# Patient Record
Sex: MALE | Race: OTHER | HISPANIC OR LATINO | URBAN - METROPOLITAN AREA
[De-identification: names, ages, dates, MRNs, and addresses within clinical notes are randomized per-mention and may not be internally consistent; named-entity substitution may affect disease eponyms.]

---

## 2021-04-11 ENCOUNTER — INPATIENT (INPATIENT)
Facility: HOSPITAL | Age: 66
LOS: 11 days | Discharge: HOME | End: 2021-04-23
Attending: PSYCHIATRY & NEUROLOGY | Admitting: PSYCHIATRY & NEUROLOGY
Payer: COMMERCIAL

## 2021-04-11 VITALS
DIASTOLIC BLOOD PRESSURE: 73 MMHG | RESPIRATION RATE: 18 BRPM | HEART RATE: 69 BPM | TEMPERATURE: 98 F | WEIGHT: 250 LBS | HEIGHT: 71 IN | SYSTOLIC BLOOD PRESSURE: 176 MMHG | OXYGEN SATURATION: 97 %

## 2021-04-11 LAB
BASOPHILS # BLD AUTO: 0.07 K/UL — SIGNIFICANT CHANGE UP (ref 0–0.2)
BASOPHILS NFR BLD AUTO: 1.2 % — HIGH (ref 0–1)
EOSINOPHIL # BLD AUTO: 0.46 K/UL — SIGNIFICANT CHANGE UP (ref 0–0.7)
EOSINOPHIL NFR BLD AUTO: 7.8 % — SIGNIFICANT CHANGE UP (ref 0–8)
ETHANOL SERPL-MCNC: <10 MG/DL — SIGNIFICANT CHANGE UP
HCT VFR BLD CALC: 41.2 % — LOW (ref 42–52)
HGB BLD-MCNC: 13.5 G/DL — LOW (ref 14–18)
IMM GRANULOCYTES NFR BLD AUTO: 0.2 % — SIGNIFICANT CHANGE UP (ref 0.1–0.3)
LYMPHOCYTES # BLD AUTO: 2.27 K/UL — SIGNIFICANT CHANGE UP (ref 1.2–3.4)
LYMPHOCYTES # BLD AUTO: 38.3 % — SIGNIFICANT CHANGE UP (ref 20.5–51.1)
MCHC RBC-ENTMCNC: 28.7 PG — SIGNIFICANT CHANGE UP (ref 27–31)
MCHC RBC-ENTMCNC: 32.8 G/DL — SIGNIFICANT CHANGE UP (ref 32–37)
MCV RBC AUTO: 87.7 FL — SIGNIFICANT CHANGE UP (ref 80–94)
MONOCYTES # BLD AUTO: 0.46 K/UL — SIGNIFICANT CHANGE UP (ref 0.1–0.6)
MONOCYTES NFR BLD AUTO: 7.8 % — SIGNIFICANT CHANGE UP (ref 1.7–9.3)
NEUTROPHILS # BLD AUTO: 2.66 K/UL — SIGNIFICANT CHANGE UP (ref 1.4–6.5)
NEUTROPHILS NFR BLD AUTO: 44.7 % — SIGNIFICANT CHANGE UP (ref 42.2–75.2)
NRBC # BLD: 0 /100 WBCS — SIGNIFICANT CHANGE UP (ref 0–0)
PLATELET # BLD AUTO: 158 K/UL — SIGNIFICANT CHANGE UP (ref 130–400)
RBC # BLD: 4.7 M/UL — SIGNIFICANT CHANGE UP (ref 4.7–6.1)
RBC # FLD: 13.9 % — SIGNIFICANT CHANGE UP (ref 11.5–14.5)
WBC # BLD: 5.93 K/UL — SIGNIFICANT CHANGE UP (ref 4.8–10.8)
WBC # FLD AUTO: 5.93 K/UL — SIGNIFICANT CHANGE UP (ref 4.8–10.8)

## 2021-04-11 PROCEDURE — 99285 EMERGENCY DEPT VISIT HI MDM: CPT

## 2021-04-11 PROCEDURE — 90792 PSYCH DIAG EVAL W/MED SRVCS: CPT | Mod: 95

## 2021-04-11 PROCEDURE — 93010 ELECTROCARDIOGRAM REPORT: CPT

## 2021-04-11 NOTE — ED ADULT NURSE NOTE - OBJECTIVE STATEMENT
Pt c/p psych eval , constant observation initiated , reports visual hallucination , pt denies suicidal and homicidal thoughts this time . Per pts son , pt attempted to kill himself earlier this week

## 2021-04-11 NOTE — ED PROVIDER NOTE - PHYSICAL EXAMINATION
CONSTITUTIONAL: Well-developed; well-nourished; in no acute distress, nontoxic appearing  SKIN: skin exam is warm and dry  HEAD: Normocephalic; atraumatic.  ENT: MMM  NECK: ROM intact.  CARD: S1, S2 normal, no murmur  RESP: No wheezes, rales or rhonchi. Good air movement bilaterally  ABD: soft; non-distended; non-tender.   EXT: Normal ROM.   NEURO: awake, alert, following commands, oriented, grossly unremarkable. No Focal deficits. GCS 15.   PSYCH: Cooperative, appropriate.

## 2021-04-11 NOTE — ED ADULT TRIAGE NOTE - CHIEF COMPLAINT QUOTE
pt states he is having visual hallucinations. pt denies suicidal or homicidal thoughts now but as per son pt tried to kill himself earlier this week. 1:1 initiated in triage.

## 2021-04-11 NOTE — ED PROVIDER NOTE - PROGRESS NOTE DETAILS
Discussed with Dr. Patel from [facility], will evaluate patient. I called [facility] and informed them about the COVID-19 test results and that patient is medically cleared by psychiatric evaluation and care. Discussed with [facility], as per them, patient is waiting for Bed availability. Patient remained stable in ED, signed out to Dr. Carey pending [facility] bed availability.

## 2021-04-11 NOTE — ED PROVIDER NOTE - NS ED ROS FT
Review of Systems:  	•	CONSTITUTIONAL: no fever, no diaphoresis, no chills  	•	SKIN: no rash  	•	HEMATOLOGIC: no bleeding, no bruising  	•	EYES: no eye pain, no blurry vision  	•	ENT: no change in hearing, no sore throat, no ear pain or tinnitus  	•	RESPIRATORY: no shortness of breath, no cough  	•	CARDIAC: no chest pain, no palpitations  	•	GI: no abd pain, no nausea, no vomiting, no diarrhea  	•	GENITO-URINARY: no discharge, no dysuria; no hematuria, no increased urinary frequency  	•	MUSCULOSKELETAL: no joint paint, no swelling, no redness  	•	NEUROLOGIC: no weakness, no headache  	•	PSYCH: +depression, +si w/ plan, +visual hallucinations

## 2021-04-11 NOTE — ED PROVIDER NOTE - OBJECTIVE STATEMENT
65 year old male, no known past medical history, who presents with son w/ hallucinations and SI. Patient drives school bus for work, as per patient/son at bedside, patient has been expressing signs of "seeing wings on the bus" and thoughts of "driving into the water." Patient also with paranoia x2 weeks, concerned individuals on the street are trying to steal from him/government trying to steal from him. denies alcohol/drug use. as per family, no hx known psych disorders, no hx similar.

## 2021-04-11 NOTE — ED PROVIDER NOTE - ATTENDING CONTRIBUTION TO CARE
As requested by patient, his son translated. Patient son gave the most of the information as well.   Patient is BIB his son for psychiatric evaluation due to paranoia, described as people are following him, people are watching him, while driving feels his car/bus may get wings and fly, is afraid to drive, is feeling depressed, denies SI/HI, denies suicidal attempts, denies f/c/n/v/cp/sob/HA/dizziness/back pain or any other symptoms. Denies drug or alcohol use, denies trauma.   Vitals reviewed.   Patient is awake, alert, o x 3, speaking in full sentences, appears comfortable and not in distress.   lungs: CTA, no crackles.   abd: +BS, NT, ND, soft,   CNS: awake, alert, o x 3, no focal neurologic deficits.   A/P: New onset paranoia and ?panic attacks,   labs, EKG, Brain CT, reevaluation.   Psychiatry consult.

## 2021-04-11 NOTE — ED PROVIDER NOTE - CLINICAL SUMMARY MEDICAL DECISION MAKING FREE TEXT BOX
Patient seen at bedside. Per 1:1 at bedside, no agitation noted overnight, reportedly slept intermittently during the night and had a bit of breakfast.  Patient continues to report ongoing depressive thoughts.  9.39 legals completed and placed in chart.  Bed obtained at [facility].  Transfer.

## 2021-04-11 NOTE — ED ADULT NURSE NOTE - CHIEF COMPLAINT QUOTE
pt states he is having visual hallucinations. pt denies suicidal or homicidal thoughts now but as per son pt tried to kill himself earlier this week. 1:1 initiated in triage.
Arm pain, diffuse, right

## 2021-04-12 DIAGNOSIS — Z87.828 PERSONAL HISTORY OF OTHER (HEALED) PHYSICAL INJURY AND TRAUMA: Chronic | ICD-10-CM

## 2021-04-12 DIAGNOSIS — F32.9 MAJOR DEPRESSIVE DISORDER, SINGLE EPISODE, UNSPECIFIED: ICD-10-CM

## 2021-04-12 LAB
ANION GAP SERPL CALC-SCNC: 9 MMOL/L — SIGNIFICANT CHANGE UP (ref 7–14)
APAP SERPL-MCNC: <5 UG/ML — LOW (ref 10–30)
APPEARANCE UR: CLEAR — SIGNIFICANT CHANGE UP
BILIRUB UR-MCNC: NEGATIVE — SIGNIFICANT CHANGE UP
BUN SERPL-MCNC: 17 MG/DL — SIGNIFICANT CHANGE UP (ref 10–20)
CALCIUM SERPL-MCNC: 9.5 MG/DL — SIGNIFICANT CHANGE UP (ref 8.5–10.1)
CHLORIDE SERPL-SCNC: 103 MMOL/L — SIGNIFICANT CHANGE UP (ref 98–110)
CO2 SERPL-SCNC: 27 MMOL/L — SIGNIFICANT CHANGE UP (ref 17–32)
COLOR SPEC: SIGNIFICANT CHANGE UP
CREAT SERPL-MCNC: 1.2 MG/DL — SIGNIFICANT CHANGE UP (ref 0.7–1.5)
DIFF PNL FLD: NEGATIVE — SIGNIFICANT CHANGE UP
GLUCOSE SERPL-MCNC: 107 MG/DL — HIGH (ref 70–99)
GLUCOSE UR QL: NEGATIVE — SIGNIFICANT CHANGE UP
KETONES UR-MCNC: NEGATIVE — SIGNIFICANT CHANGE UP
LEUKOCYTE ESTERASE UR-ACNC: NEGATIVE — SIGNIFICANT CHANGE UP
NITRITE UR-MCNC: NEGATIVE — SIGNIFICANT CHANGE UP
PH UR: 6 — SIGNIFICANT CHANGE UP (ref 5–8)
POTASSIUM SERPL-MCNC: 4.8 MMOL/L — SIGNIFICANT CHANGE UP (ref 3.5–5)
POTASSIUM SERPL-SCNC: 4.8 MMOL/L — SIGNIFICANT CHANGE UP (ref 3.5–5)
PROT UR-MCNC: NEGATIVE — SIGNIFICANT CHANGE UP
RAPID RVP RESULT: SIGNIFICANT CHANGE UP
SALICYLATES SERPL-MCNC: <0.3 MG/DL — LOW (ref 4–30)
SARS-COV-2 RNA SPEC QL NAA+PROBE: SIGNIFICANT CHANGE UP
SODIUM SERPL-SCNC: 139 MMOL/L — SIGNIFICANT CHANGE UP (ref 135–146)
SP GR SPEC: 1.02 — SIGNIFICANT CHANGE UP (ref 1.01–1.03)
UROBILINOGEN FLD QL: SIGNIFICANT CHANGE UP

## 2021-04-12 PROCEDURE — 70450 CT HEAD/BRAIN W/O DYE: CPT | Mod: 26

## 2021-04-12 RX ORDER — HYDROXYZINE HCL 10 MG
50 TABLET ORAL EVERY 6 HOURS
Refills: 0 | Status: DISCONTINUED | OUTPATIENT
Start: 2021-04-12 | End: 2021-04-23

## 2021-04-12 RX ORDER — SERTRALINE 25 MG/1
50 TABLET, FILM COATED ORAL DAILY
Refills: 0 | Status: DISCONTINUED | OUTPATIENT
Start: 2021-04-13 | End: 2021-04-23

## 2021-04-12 RX ORDER — RISPERIDONE 4 MG/1
0.5 TABLET ORAL
Refills: 0 | Status: DISCONTINUED | OUTPATIENT
Start: 2021-04-12 | End: 2021-04-14

## 2021-04-12 RX ORDER — HALOPERIDOL DECANOATE 100 MG/ML
5 INJECTION INTRAMUSCULAR EVERY 6 HOURS
Refills: 0 | Status: DISCONTINUED | OUTPATIENT
Start: 2021-04-12 | End: 2021-04-23

## 2021-04-12 RX ADMIN — RISPERIDONE 0.5 MILLIGRAM(S): 4 TABLET ORAL at 22:00

## 2021-04-12 NOTE — H&P ADULT - NSHPLABSRESULTS_GEN_ALL_CORE
13.5   5.93  )-----------( 158      ( 2021 22:47 )             41.2       04-11    139  |  103  |  17  ----------------------------<  107<H>  4.8   |  27  |  1.2    Ca    9.5      2021 22:47                    Urinalysis Basic - ( 2021 04:30 )    Color: Light Yellow / Appearance: Clear / S.016 / pH: x  Gluc: x / Ketone: Negative  / Bili: Negative / Urobili: <2 mg/dL   Blood: x / Protein: Negative / Nitrite: Negative   Leuk Esterase: Negative / RBC: x / WBC x   Sq Epi: x / Non Sq Epi: x / Bacteria: x      < from: CT Head No Cont (21 @ 03:35) >      IMPRESSION:      No evidence of acute intracranial pathology.    Mild chronic microvascular ischemic changes.            MARTHA JOE M.D., RESIDENT RADIOLOGIST  This document has been electronically signed.  ESAU BACON MD; Attending Radiologist  This document has been electronically signed. 2021  4:23AM    < end of copied text >

## 2021-04-12 NOTE — ED BEHAVIORAL HEALTH ASSESSMENT NOTE - HPI (INCLUDE ILLNESS QUALITY, SEVERITY, DURATION, TIMING, CONTEXT, MODIFYING FACTORS, ASSOCIATED SIGNS AND SYMPTOMS)
Pt is a 64yo male with no known medical history, no known psych history, presenting with family for new onset/worsening paranoia and thoughts of suicide over the past month.     COVID exposure symptoms:  - COVID test in past 90 days?  - COVID antibodies?  - COVID vaccines?  - travel outside of [facility] in past 10 days?  - contact with anyone who tested positive for COVID in past ten days? Pt is a 64yo Indonesian-speaking male employed as , domiciled alone, with no known medical history, no known psych history, presenting with family for new onset/worsening paranoia and thoughts of suicide over the past month.     Used  services for interview with patient.    Pt reports that he has been feeling very "fearful and having lots of concerns" in general, acknowledges his family has been concerned about him as well. 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 Feels like the bus is "like an airplane, going up into the air" - repeatedly states these experiences have made him very stressed, not sleeping well, not eating well. Reports these have been ongoing for about 1-2 months. States "they" have been taking pictures of him, taking pictures of his license plate, feels he has been "hacked." Believes that "they put something in my car when I am parked to detect where I am at." When these thoughts overwhelm him, he feels suicidal. He has been thinking about jumping off the bridge to end his life for about 1-2 months. Every time he goes past a bridge, he feels "they are taking pictures of me." Denies that he has intent to do this but does think about this frequently. Feels very hopeless and helpless.     He suddenly begins to talk about feeling "calm and inner peace," because "God is calling me" and telling him "don't worry." He feels God calls him to "go up with him into the tonie." Feels he is ready to join God in heaven, feels he is ready to die. Reciting Bible verses during interview.     He denies HI. Denies AVH. Denies manic symptoms.  Denies substance use.  Never seen a psychiatrist or therapist in past.    COVID exposure symptoms:  - COVID test in past 90 days? none  - COVID antibodies? none  - COVID vaccines? none  - travel outside of [facility] in past 10 days? denies  - contact with anyone who tested positive for COVID in past ten days? none

## 2021-04-12 NOTE — H&P ADULT - ASSESSMENT
ASSESSMENT: Pt is a 65M w/o significant PMH being admitted to [facility] for new paranoia and SI w/o known psychiatric history.           PLAN:   - Admit to inpatient level of care - IPP  - No known PMH; no home meds  - Regular diet   - VTE: ambulation  - Rest of plan as per psych

## 2021-04-12 NOTE — ED BEHAVIORAL HEALTH NOTE - BEHAVIORAL HEALTH NOTE
===================  PRE-HOSPITAL COURSE  ===================  SOURCE: Chart    DETAILS: Patient arrived via walk-in with son due to SI and psychotic symptoms    ============  ED COURSE   ============  SOURCE: Chart, ED RN    ARRIVAL: Patient arrived via walk-in    BELONGINGS: No items of note    BEHAVIOR: Patient arrived to the ED alert and oriented x3, patient was cooperative with ED medical and safety protocols. Patient reported SI, thoughts of driving into water or jumping from bridge. Also VH of wings when driving, no reported AH, no HI. Patient’s thought process/speech WNL. Patient remained in good behavioral control.     TREATMENT: No PRN psychiatric medication prior to assessment     VISITORS: Son    ========================  COLLATERAL  ========================  NAME: Scott Kaufman    NUMBER: [phone]    RELATIONSHIP: Son    RELIABILITY: Good    COMMENTS:     ========================  HPI  ========================    BASELINE FUNCTIONING: Patient resides alone/is , he is employed as a . Patient is high functioning at baseline, he is not in psychiatric treatment.     DATE HPI STARTED: ~1 month ago    DECOMPENSATION: Son states that family reported to him recently that patient had started acting bizarrely about a month ago. Patient was reporting hallucinations that his bus had wings and could fly, he was talking about people following him and taking pictures and that his phone was hacked. When speaking on the phone he was very short and has been guarded with family. Patient reported to his sister that he was having thoughts of killing himself by jumping off the [facility] [place]. Despite ED triage documentation that son reported patient attempted suicide last week son denies that patient did anything to try and hurt himself recently or in the past. Son got involved today with patient and brought him to the ED for evaluation. States patient has no psychiatric hx or known medical issues, no substance abuse hx, no known family hx of mental health. Son is supportive of admission at this time for stabilization and safety of patient.     SUICIDALITY: Voiced to family SI with thoughts of jumping off [facility] bridge    VIOLENCE: No hx of violence    SUBSTANCE: No hx of substance abuse?    ========================  PAST PSYCHIATRIC HISTORY  ========================    No past psych hx     ==============  OTHER HISTORY  ==============    CURRENT MEDICATION:  None known    MEDICAL HISTORY: Son denies    LEGAL ISSUES: None     FIREARM ACCESS: None    SOCIAL HISTORY: No known trauma/abuse    FAMILY HISTORY: No known family hx        COVID Exposure Screen- collateral (i.e. third-party, chart review, belongings, etc; include EMS and ED staff)  1.	*Has the patient had a COVID-19 test in the last 90 days?  (  ) Yes   (x  ) No   (  ) Unknown- Reason: _____  IF YES PROCEED TO QUESTION #2. IF NO OR UNKNOWN, PLEASE SKIP TO QUESTION #3.  2.	Date of test(s) and result(s): ________  3.	*Has the patient tested positive for COVID-19 antibodies? (  ) Yes   ( x ) No   (  ) Unknown- Reason: _____  IF YES PROCEED TO QUESTION #4. IF NO or UNKNOWN, PLEASE SKIP TO QUESTION #5.  4.	Date of positive antibody test: ________  5.	*Has the patient received 2 doses of the COVID-19 vaccine? (  ) Yes   (x  ) No   (  ) Unknown- Reason: _____  IF YES PROCEED TO QUESTION #6. IF NO or UNKNOWN, PLEASE SKIP TO QUESTION #7.  6.	 Date of second dose: ________  7.	*In the past 10 days, has the patient been around anyone with a positive COVID-19 test?* (  ) Yes   (x  ) No   (  ) Unknown- Reason: __  IF YES PROCEED TO QUESTION #8. IF NO or UNKNOWN, PLEASE SKIP TO QUESTION #13.  8.	Was the patient within 6 feet of them for at least 15 minutes? (  ) Yes   (  ) No   (  ) Unknown- Reason: _____  9.	Did the patient provide care for them? (  ) Yes   (  ) No   (  ) Unknown- Reason: ______  10.	Did the patient have direct physical contact with them (touched, hugged, or kissed them)? (  ) Yes   (  ) No    (  ) Unknown- Reason: __  11.	Did the patient share eating or drinking utensils with them? (  ) Yes   (  ) No    (  ) Unknown- Reason: ____  12.	Did they sneeze, cough, or somehow get respiratory droplets on the patient? (  ) Yes   (  ) No    (  ) Unknown- Reason: ______  13.	*Has the patient been out of [place] within the past 10 days?* (  ) Yes   ( x ) No   (  ) Unknown- Reason: _____  IF YES PLEASE ANSWER THE FOLLOWING QUESTIONS:  14.	Which state/country did they go to? ______  15.	Were they there over 24 hours? (  ) Yes   (  ) No    (  ) Unknown- Reason: ______  16.	Date of return to [facility]: ______

## 2021-04-12 NOTE — ED ADULT NURSE REASSESSMENT NOTE - NS ED NURSE REASSESS COMMENT FT1
PIV access removed from R AC, pt awake resting comfortably on 1:1. Pt denies any SI at this time, admits to having visual hallucinations. Will continue to monitor.

## 2021-04-12 NOTE — ED BEHAVIORAL HEALTH ASSESSMENT NOTE - DETAILS
see hpi for details of SI son in [facility] ED attending pending collateral info from son obtained collateral from son

## 2021-04-12 NOTE — ED BEHAVIORAL HEALTH ASSESSMENT NOTE - SUMMARY
Pt is a 66yo Ecuadorean-speaking male employed as , , domiciled alone, with no known medical history, no known psych history, presenting with family for new onset/worsening paranoia and thoughts of suicide over the past month. Unclear trigger/stressor leading to these symptoms. Concerning that pt is high acute risk for suicide due to ongoing/persisting SI with thoughts of jumping from bridge due to feeling overwhelmed, anxious, paranoid that his bus will "fly into the air" or "go into the water", believing God is calling him to heaven, experiencing insomnia, poor appetite, feeling hopeless.     ********** Pt is a 66yo Citizen of [place]-speaking male employed as a , , domiciled alone, with no known medical history, no known psych history, presenting with family for new onset/worsening paranoia and thoughts of suicide over the past month. Unclear trigger/stressor leading to these symptoms. Concerning that pt is high acute risk for suicide due to ongoing/persisting SI with thoughts of jumping from bridge due to feeling overwhelmed, anxious, paranoid that his bus will "fly into the air" or "go into the water", believing God is calling him to heaven, experiencing insomnia, poor appetite, feeling hopeless.     Collateral from son is also concerning for noticeable change in behavior/demeanor, more withdrawn/guarded in past week or so, concerned about SI statements and for pt's imminent safety.     Pt will need to be psychiatrically hospitalized for acute stabilization as he is at acute risk for suicide.

## 2021-04-12 NOTE — ED BEHAVIORAL HEALTH ASSESSMENT NOTE - RISK ASSESSMENT
-high acute risk: endorsing SI with thoughts of jumping off bridge in past month, anxiety, insomnia, possibly psychotic/paranoid, hyperreligious and believing God is calling him to heaven.  -no chronic risk: no psych history, no psychiatric hospitalizations, no suicide attempts in past. High Acute Suicide Risk

## 2021-04-12 NOTE — ED BEHAVIORAL HEALTH ASSESSMENT NOTE - NSSUICPROTFACT_PSY_ALL_CORE
Responsibility to children, family, or others/Cultural, spiritual and/or moral attitudes against suicide

## 2021-04-12 NOTE — H&P ADULT - NSHPPHYSICALEXAM_GEN_ALL_CORE
Vital Signs Last 24 Hrs  T(C): 35.9 (12 Apr 2021 19:01), Max: 36.5 (11 Apr 2021 22:01)  T(F): 96.7 (12 Apr 2021 19:01), Max: 97.7 (11 Apr 2021 22:01)  HR: 63 (12 Apr 2021 19:01) (60 - 69)  BP: 140/72 (12 Apr 2021 19:01) (102/53 - 176/73)  BP(mean): --  RR: 18 (12 Apr 2021 19:01) (18 - 18)  SpO2: 96% (12 Apr 2021 15:35) (96% - 100%)    PHYSICAL EXAM:    Constitutional: NAD, A&O x3    Respiratory: +air entry, no rales, no rhonchi, no wheezes    Cardiovascular: +S1 and S2, regular rate and rhythm    Gastrointestinal: +BS, soft, non-tender, not distended    Extremities:  no edema, no calf tenderness    Skin: no rashes, normal turgor

## 2021-04-12 NOTE — H&P ADULT - HISTORY OF PRESENT ILLNESS
Pt is a 65M w/o significant PMH being admitted to IPP for new paranoia and SI w/o known psychiatric history. Denies HA, dizziness, fever/chills, cough, SOB, chest pain, NVD, abdominal pain, change in urination and change in BM.       HPI as per ED psych: "Pt is a 64yo Kazakh-speaking male employed as , domiciled alone, with no known medical history, no known psych history, presenting with family for new onset/worsening paranoia and thoughts of suicide over the past month.     Used  services for interview with patient.    Pt reports that he has been feeling very "fearful and having lots of concerns" in general, acknowledges his family has been concerned about him as well. 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 Feels like the bus is "like an airplane, going up into the air" - repeatedly states these experiences have made him very stressed, not sleeping well, not eating well. Reports these have been ongoing for about 1-2 months. States "they" have been taking pictures of him, taking pictures of his license plate, feels he has been "hacked." Believes that "they put something in my car when I am parked to detect where I am at." When these thoughts overwhelm him, he feels suicidal. He has been thinking about jumping off the bridge to end his life for about 1-2 months. Every time he goes past a bridge, he feels "they are taking pictures of me." Denies that he has intent to do this but does think about this frequently. Feels very hopeless and helpless.     He suddenly begins to talk about feeling "calm and inner peace," because "God is calling me" and telling him "don't worry." He feels God calls him to "go up with him into the tonie." Feels he is ready to join God in heaven, feels he is ready to die. Reciting Bible verses during interview. "

## 2021-04-13 DIAGNOSIS — F29 UNSPECIFIED PSYCHOSIS NOT DUE TO A SUBSTANCE OR KNOWN PHYSIOLOGICAL CONDITION: ICD-10-CM

## 2021-04-13 LAB
A1C WITH ESTIMATED AVERAGE GLUCOSE RESULT: 6 % — HIGH (ref 4–5.6)
CHOLEST SERPL-MCNC: 232 MG/DL — HIGH
CULTURE RESULTS: NO GROWTH — SIGNIFICANT CHANGE UP
ESTIMATED AVERAGE GLUCOSE: 126 MG/DL — HIGH (ref 68–114)
HCV AB S/CO SERPL IA: 0.04 COI — SIGNIFICANT CHANGE UP
HCV AB SERPL-IMP: SIGNIFICANT CHANGE UP
HDLC SERPL-MCNC: 37 MG/DL — LOW
LIPID PNL WITH DIRECT LDL SERPL: 176 MG/DL — HIGH
NON HDL CHOLESTEROL: 195 MG/DL — HIGH
SPECIMEN SOURCE: SIGNIFICANT CHANGE UP
TRIGL SERPL-MCNC: 159 MG/DL — HIGH

## 2021-04-13 PROCEDURE — 99253 IP/OBS CNSLTJ NEW/EST LOW 45: CPT

## 2021-04-13 PROCEDURE — 99232 SBSQ HOSP IP/OBS MODERATE 35: CPT

## 2021-04-13 RX ADMIN — RISPERIDONE 0.5 MILLIGRAM(S): 4 TABLET ORAL at 20:53

## 2021-04-13 RX ADMIN — SERTRALINE 50 MILLIGRAM(S): 25 TABLET, FILM COATED ORAL at 08:42

## 2021-04-13 RX ADMIN — RISPERIDONE 0.5 MILLIGRAM(S): 4 TABLET ORAL at 08:42

## 2021-04-13 NOTE — PROGRESS NOTE BEHAVIORAL HEALTH - NSBHADDHXPSYSOCFT_PSY_A_CORE
Domiciled alone in [place], [place]. , pt's ex-wife and son live in , are in good relations. Employed as a .

## 2021-04-13 NOTE — PROGRESS NOTE BEHAVIORAL HEALTH - NSBHADDHXSUBSTFT_PSY_A_CORE
Denies cigarettes use, alcohol use, cannabis use. Denies hx and recent use of other illicit substances.

## 2021-04-13 NOTE — PROGRESS NOTE BEHAVIORAL HEALTH - NSBHADMITMEDEDUDETAILS_A_CORE FT
Educated on the risks and benefits of risperdal and zoloft, and side effects profiles. Pt verbalized understanding.

## 2021-04-13 NOTE — CONSULT NOTE ADULT - ASSESSMENT
65M w/o significant PMH being admitted to IPP for new paranoia and SI w/o known psychiatric history.       Mixed HLD:  No need to start statin  Would change diet to DASH  Check A1C, if A1C >5.7% change diet to CHO with DASH  if A1C >6.5% call medicine team for re-eval

## 2021-04-13 NOTE — PROGRESS NOTE BEHAVIORAL HEALTH - NSBHFUPINTERVALHXFT_PSY_A_CORE
( ID #[id number], Darek)    Pt seen and evaluated, chart reviewed. As per nursing report, no acute events overnight, no PRNs. On evaluation, pt endorses he ( ID #[id number], Darek)    Pt seen and evaluated, chart reviewed. As per nursing report, no acute events overnight, no PRNs. On evaluation, pt presents calm and cooperative. He reports that "they" hacked his phone, reports his cellphone has been acting up and he read online that "they" could have hacked it. When evaluated on "they", pt endorses that "they" are following him when he drives around, states they have been trying to taking his pictures, states "I saw [place] plates, they are from [place]". Pt is unable to provide further details, preservative that he is being hacked, followed and monitored. He reports paranoia began 2 months ago, reports distress, states his feelings of being hacked and followed caused his depression and SI prior to admission. He is unable to identify a stressor/trigger 2 months ago before onset of paranoia, denies changes to his mood and sleep prior to onset. He denies prior s/sx of depression and gregorio. He reports d/t paranoia, he is now having low mood a/w poor sleep and anxiety. 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 Denies SI/HI, intent and plan. He denies hx and recent use of all substances. He denies all psychiatric hx, reports no previous trials of psychotropics/prior OP tx/prior hospitalizations/SA/NSSIB. Denies recent memory issues, AOx4.    Obtained collateral from pt's son, Scott - corroborates pt's report, states pt has no psychiatric hx, states pt with no observed hx of depression, gregorio or/and psychosis. States pt lives alone in [place], states no one sees or/and talks with pt daily, states pt's siblings live in [place] but are "not close". He states family began noticing pt making bizarre statements a month ago with his bus having wings. He states last week pt visited him and he told him that he was being hacked and followed/monitored. He states over the weekend pt voiced to his aunt that he was depressed and had SI, which is when he went to see pt and brought him to the ED for an evaluation.

## 2021-04-13 NOTE — PROGRESS NOTE BEHAVIORAL HEALTH - SUMMARY
64 y/o Lithuanian-speaking male, domiciled alone, , adult son, employed as , no known PMH, no PPH, BIB son for new onset/worsening paranoia for 2 months and recent SI. His presentation of psychosis remains unclear at this time d/t his advanced age and no prior psychiatric hx as confirmed by his son. R/o organic etiology, pending labs. R/o substance-induced, pt denies, pending Utox.     #Psychosis, unspecified; depression  -c/w zoloft 50 mg daily  -c/w risperdal 0.5 mg bid  -start hydroxyzone 25 mg q6h prn for anxiety/insomnia  -first break psychosis workup: Head CT (neg); pending RPR/HIV, b12, folate, IBETH, ESR, TSH, UDS    #HLD  -medicine consulted    #Agitation  -for agitation not amenable for verbal redirection, may give haldol 5 mg q6h prn with escalation to IM if pt is a danger to self or/and others with repeat EKG to ensure QTc <500 ms 64 y/o Azeri-speaking male, domiciled alone, , adult son, employed as , no known PMH, no PPH, BIB son for new onset/worsening paranoia for 2 months and recent SI. His presentation of psychosis remains unclear at this time d/t his advanced age and no prior psychiatric hx as confirmed by his son. Pt endorses depressive sx 2/2 paranoia. R/o organic etiology, pending labs. R/o substance-induced, pt denies, pending Utox.     #Psychosis, unspecified; mood disorder NOS  -c/w zoloft 50 mg daily  -c/w risperdal 0.5 mg bid  -start hydroxyzine 25 mg q6h prn for anxiety/insomnia  -first break psychosis workup: Head CT (neg); pending RPR/HIV, b12, folate, IBETH, ESR, TSH, urine drug screen    #HLD  -medicine consulted    #Agitation  -for agitation not amenable for verbal redirection, may give haldol 5 mg q6h prn with escalation to IM if pt is a danger to self or/and others with repeat EKG to ensure QTc <500 ms

## 2021-04-13 NOTE — PROGRESS NOTE BEHAVIORAL HEALTH - NSBHFUPADDHPIFT_PSY_A_CORE
Pt is a 64yo Northern Irish-speaking male employed as , domiciled alone, with no known medical history, no known psych history, presenting with family for new onset/worsening paranoia and thoughts of suicide over the past month.     Used  services for interview with patient.    Pt reports that he has been feeling very "fearful and having lots of concerns" in general, acknowledges his family has been concerned about him as well. 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 Feels like the bus is "like an airplane, going up into the air" - repeatedly states these experiences have made him very stressed, not sleeping well, not eating well. Reports these have been ongoing for about 1-2 months. States "they" have been taking pictures of him, taking pictures of his license plate, feels he has been "hacked." Believes that "they put something in my car when I am parked to detect where I am at." When these thoughts overwhelm him, he feels suicidal. He has been thinking about jumping off the bridge to end his life for about 1-2 months. Every time he goes past a bridge, he feels "they are taking pictures of me." Denies that he has intent to do this but does think about this frequently. Feels very hopeless and helpless.     He suddenly begins to talk about feeling "calm and inner peace," because "God is calling me" and telling him "don't worry." He feels God calls him to "go up with him into the tonie." Feels he is ready to join God in heaven, feels he is ready to die. Reciting Bible verses during interview.     He denies HI. Denies AVH. Denies manic symptoms.  Denies substance use.  Never seen a psychiatrist or therapist in past. Pt is a 64yo Swedish-speaking male employed as , domiciled alone, with no known medical history, no known psych history, presenting with family for new onset/worsening paranoia and thoughts of suicide over the past month.     Used  services for interview with patient.    Pt reports that he has been feeling very "fearful and having lots of concerns" in general, acknowledges his family has been concerned about him as well. 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 Feels like the bus is "like an airplane, going up into the air" - repeatedly states these experiences have made him very stressed, not sleeping well, not eating well. Reports these have been ongoing for about 1-2 months. States "they" have been taking pictures of him, taking pictures of his license plate, feels he has been "hacked." Believes that "they put something in my car when I am parked to detect where I am at." When these thoughts overwhelm him, he feels suicidal. He has been thinking about jumping off the bridge to end his life for about 1-2 months. Every time he goes past a bridge, he feels "they are taking pictures of me." Denies that he has intent to do this but does think about this frequently. Feels very hopeless and helpless.     He suddenly begins to talk about feeling "calm and inner peace," because "God is calling me" and telling him "don't worry." He feels God calls him to "go up with him into the tonie." Feels he is ready to join God in heaven, feels he is ready to die. Reciting Bible verses during interview.     He denies HI. Denies AVH. Denies manic symptoms.  Denies substance use.  Never seen a psychiatrist or therapist in past.    Collateral obtained from ED writer - Scott Jung., son - states that family reported to him recently that patient had started acting bizarrely about a month ago. Patient was reporting hallucinations that his bus had wings and could fly, he was talking about people following him and taking pictures and that his phone was hacked. When speaking on the phone he was very short and has been guarded with family. Patient reported to his sister that he was having thoughts of killing himself by jumping off the [facility]. Despite ED triage documentation that son reported patient attempted suicide last week son denies that patient did anything to try and hurt himself recently or in the past. Son got involved today with patient and brought him to the ED for evaluation. States patient has no psychiatric hx or known medical issues, no substance abuse hx, no known family hx of mental health. Son is supportive of admission at this time for stabilization and safety of patient.   SUICIDALITY: Voiced to family SI with thoughts of jumping off [facility] bridge

## 2021-04-14 LAB
ALBUMIN SERPL ELPH-MCNC: 4 G/DL — SIGNIFICANT CHANGE UP (ref 3.5–5.2)
ALP SERPL-CCNC: 48 U/L — SIGNIFICANT CHANGE UP (ref 30–115)
ALT FLD-CCNC: 16 U/L — SIGNIFICANT CHANGE UP (ref 0–41)
AMPHET UR-MCNC: NEGATIVE — SIGNIFICANT CHANGE UP
AST SERPL-CCNC: 18 U/L — SIGNIFICANT CHANGE UP (ref 0–41)
BARBITURATES UR SCN-MCNC: NEGATIVE — SIGNIFICANT CHANGE UP
BENZODIAZ UR-MCNC: NEGATIVE — SIGNIFICANT CHANGE UP
BILIRUB DIRECT SERPL-MCNC: <0.2 MG/DL — SIGNIFICANT CHANGE UP (ref 0–0.2)
BILIRUB INDIRECT FLD-MCNC: >0.2 MG/DL — SIGNIFICANT CHANGE UP (ref 0.2–1.2)
BILIRUB SERPL-MCNC: 0.4 MG/DL — SIGNIFICANT CHANGE UP (ref 0.2–1.2)
COCAINE METAB.OTHER UR-MCNC: NEGATIVE — SIGNIFICANT CHANGE UP
COVID-19 SPIKE DOMAIN AB INTERP: NEGATIVE — SIGNIFICANT CHANGE UP
COVID-19 SPIKE DOMAIN ANTIBODY RESULT: 0.4 U/ML — SIGNIFICANT CHANGE UP
DRUG SCREEN 1, URINE RESULT: SIGNIFICANT CHANGE UP
ERYTHROCYTE [SEDIMENTATION RATE] IN BLOOD: 7 MM/HR — SIGNIFICANT CHANGE UP (ref 0–10)
FOLATE SERPL-MCNC: 10.9 NG/ML — SIGNIFICANT CHANGE UP
HIV 1+2 AB+HIV1 P24 AG SERPL QL IA: SIGNIFICANT CHANGE UP
METHADONE UR-MCNC: NEGATIVE — SIGNIFICANT CHANGE UP
OPIATES UR-MCNC: NEGATIVE — SIGNIFICANT CHANGE UP
PCP UR-MCNC: NEGATIVE — SIGNIFICANT CHANGE UP
PROPOXYPHENE QUALITATIVE URINE RESULT: NEGATIVE — SIGNIFICANT CHANGE UP
PROT SERPL-MCNC: 6.8 G/DL — SIGNIFICANT CHANGE UP (ref 6–8)
SARS-COV-2 IGG+IGM SERPL QL IA: 0.4 U/ML — SIGNIFICANT CHANGE UP
SARS-COV-2 IGG+IGM SERPL QL IA: NEGATIVE — SIGNIFICANT CHANGE UP
THC UR QL: NEGATIVE — SIGNIFICANT CHANGE UP
TSH SERPL-MCNC: 3.13 UIU/ML — SIGNIFICANT CHANGE UP (ref 0.27–4.2)
VIT B12 SERPL-MCNC: 377 PG/ML — SIGNIFICANT CHANGE UP (ref 232–1245)

## 2021-04-14 PROCEDURE — 99231 SBSQ HOSP IP/OBS SF/LOW 25: CPT

## 2021-04-14 RX ORDER — RISPERIDONE 4 MG/1
1 TABLET ORAL
Refills: 0 | Status: DISCONTINUED | OUTPATIENT
Start: 2021-04-14 | End: 2021-04-19

## 2021-04-14 RX ADMIN — RISPERIDONE 0.5 MILLIGRAM(S): 4 TABLET ORAL at 08:45

## 2021-04-14 RX ADMIN — RISPERIDONE 1 MILLIGRAM(S): 4 TABLET ORAL at 20:22

## 2021-04-14 RX ADMIN — SERTRALINE 50 MILLIGRAM(S): 25 TABLET, FILM COATED ORAL at 08:45

## 2021-04-14 NOTE — PROGRESS NOTE BEHAVIORAL HEALTH - NSBHFUPINTERVALHXFT_PSY_A_CORE
( ID #[id number], Kamaljit)    Pt seen and evaluated, chart reviewed. As per nursing report, no acute events overnight, no PRNs. On evaluation, pt presents calm and cooperative eating breakfast. He reports mood "ok", endorses good sleep and appetite. He denies AVH. Denies paranoia, then requests if he will be getting his picture back, states he had his picture taking and wanted it back, appears suspicious. Educated on unit procedures, he verbalizes understanding. Pt continues to endorse that his phone was hacked by "them" and "they" followed him, denies "they" are following him on the unit, states "I feel safe here". He reports resolution of SI since admission d/t feeling safe. He denies HI/I/P. Pt adherent to medications, denies negative side effects. Pt in good behavioral control. Encouraged groups/DBT.  Pt denies s/sx of Covid-19.

## 2021-04-14 NOTE — PROGRESS NOTE BEHAVIORAL HEALTH - SUMMARY
Family Medicine Office Note      Subjective  Chief Complaint:   Chief Complaint   Patient presents with   • Blood Pressure     Follow up   • MEDICATION ISSUE     Medication check up       History of Present Illness  Evelina is a 59 year old female.     Hypertension - Lisinopril/HCTZ   Migraines - Saw Neurology who felt she had Complex Migraine less likely CVA. She has been on Amitriptyline nightly but still getting migraines every other day. She stopped Plavix, takes baby ASA one time per day.  Amitriptyline makes her tired.   Hyperlipidemia, ? H/o CVA- Crestor 5MG and tolerates this well.   Vitamin D - High dose Vitamin D weekly - she is out of this. She will start 5,000 IU daily and recheck with nexxt set of labs   Depression - Fluoxetine, doing well mood wise   Hypothyroidism - TSH WNL 1/18/21     ALLERGIES:   Allergen Reactions   • Atorvastatin MYALGIA       Current Outpatient Medications   Medication Sig Dispense Refill   • tiZANidine (ZANAFLEX) 2 MG tablet Take 1-2 tablets by mouth every 6 hours as needed for Muscle spasms. 60 tablet 1   • traMADol (ULTRAM) 50 MG tablet Take 1-2 tablets by mouth every 6 hours as needed for Pain. 30 tablet 0   • neomycin-colistin-hydrocortisone-thonzonium (Cortisporin-TC) 3.3-3-10-0.5 MG/ML otic suspension Place 3 drops into right ear 4 times daily. 10 mL 0   • levothyroxine 175 MCG tablet TAKE 1 TABLET BY MOUTH DAILY 90 tablet 0   • rosuvastatin (CRESTOR) 5 MG tablet Take 1 tablet by mouth daily. 30 tablet 5   • amitriptyline (ELAVIL) 25 MG tablet Take 1 tablet by mouth nightly. 30 tablet 5   • clopidogrel (PLAVIX) 75 MG tablet Take 1 tablet by mouth every day.     • Aspirin Buf,CaCarb-MgCarb-MgO, 81 MG Tab 81 mg.     • lisinopril-hydroCHLOROthiazide (ZESTORETIC) 10-12.5 MG per tablet Take 1 tablet by mouth daily. 90 tablet 1   • fluoxetine (PROZAC) 40 MG capsule Take 1 capsule by mouth daily. 90 capsule 1   • oxybutynin (DITROPAN-XL) 5 MG 24 hr tablet TAKE 1 TABLET BY MOUTH  DAILY 90 tablet 1   • Vitamin D, Ergocalciferol, 1.25 mg (50,000 units) capsule Take one tablet by mouth one time weekly 12 capsule 3   • tiZANidine (ZANAFLEX) 2 MG tablet Take 1 tablet by mouth every 6 hours as needed for Muscle spasms. 30 tablet 3     No current facility-administered medications for this visit.        I have reviewed the patient's problem list, past medical, surgical, family and social history.     Review of Systems  A ROS was completed and was negative except as listed in HPI above or listed below.    Objective:  Vitals: Blood pressure 124/82, pulse 70, temperature 97.1 °F (36.2 °C), weight 107.3 kg, SpO2 98 %.    General Appearance: Well groomed, cooperative, appears stated age.  Head: NC/AT  Eyes: PERRL, conjunctivae/sclerae normal. No erythema, edema or exudate.  Throat: Lips normal without lesions, buccal mucosa normal. Soft palate, posterior oropharynx normal.  Neck: No adenopathy, supple, symmetrical, trachea midline and no tenderness/mass/nodules.  Lungs: Normal respiratory effort, clear to auscultation and no wheezes or rales.  Heart: Regular rate and rhythm, S1 and S2 normal. No murmur, click, rub or gallop.    Assessment/Plan  Benign essential hypertension  (primary encounter diagnosis)  Other migraine without status migrainosus, not intractable  Other hyperlipidemia  Vitamin D deficiency  History of CVA (cerebrovascular accident) without residual deficits    Orders Placed This Encounter   • DISCONTD: topiramate (TOPAMAX) 25 MG tablet   • aspirin (ECOTRIN) 81 MG EC tablet   • Cholecalciferol (Vitamin D-3) 125 mcg (5,000 units) tablet   • tiZANidine (ZANAFLEX) 2 MG tablet     BP at goal  Continue current medications    Lipids at goal, continue statin. Continue baby ASA for questionable history of CVA vs complex migraine    Start over the counter 5,000 IU Vitamin D3     STOP Amitriptyline due to side effects    Start Topiramate (Topamax) 25mg tablet once daily at night x 2 weeks, then  increase to 25mg tablet every 12 hours (two times daily).     If no improvement in headaches 2 weeks and tolerating well without significant side effects can increase to 25mg in the morning and 50mg (two tablets) at night, if no improvement after 2 weeks can increase to 50mg every 12 hours.    Return in about 3 months (around 4/28/2021) for medication recheck.      Kenia Shaver MD     66 y/o Malay-speaking male, domiciled alone, , adult son, employed as , no known PMH, no PPH, BIB son for new onset/worsening paranoia for 2 months and recent SI. His presentation of psychosis remains unclear at this time d/t his advanced age and no prior psychiatric hx as confirmed by his son. Pt endorses depressive sx 2/2 paranoia. R/o organic etiology, pending labs. R/o substance-induced, pt denies, pending Utox.     #Psychosis, unspecified; mood disorder NOS  -c/w zoloft 50 mg daily  -c/w risperdal 0.5 mg bid --> titrate risperdal 1 mg bid (4/14)  -start hydroxyzine 25 mg q6h prn for anxiety/insomnia  -first break psychosis workup: Head CT (neg); pending RPR/HIV, b12, folate, IBETH, ESR, TSH, urine drug screen    #HLD  -medicine consulted    #Agitation  -for agitation not amenable for verbal redirection, may give haldol 5 mg q6h prn with escalation to IM if pt is a danger to self or/and others with repeat EKG to ensure QTc <500 ms

## 2021-04-15 LAB
RPR SER-TITR: (no result)
RPR SERPL-ACNC: REACTIVE
T PALLIDUM AB TITR SER: POSITIVE

## 2021-04-15 PROCEDURE — 99231 SBSQ HOSP IP/OBS SF/LOW 25: CPT

## 2021-04-15 RX ADMIN — RISPERIDONE 1 MILLIGRAM(S): 4 TABLET ORAL at 20:14

## 2021-04-15 RX ADMIN — SERTRALINE 50 MILLIGRAM(S): 25 TABLET, FILM COATED ORAL at 08:02

## 2021-04-15 RX ADMIN — RISPERIDONE 1 MILLIGRAM(S): 4 TABLET ORAL at 08:02

## 2021-04-15 NOTE — PROGRESS NOTE BEHAVIORAL HEALTH - SUMMARY
66 y/o Frisian-speaking male, domiciled alone, , adult son, employed as , no known PMH, no PPH, BIB son for new onset/worsening paranoia for 2 months and recent SI. His presentation of psychosis remains unclear at this time d/t his advanced age and no prior psychiatric hx as confirmed by his son. Pt endorses depressive sx 2/2 paranoia. R/o organic etiology, pending labs. R/o substance-induced, pt denies, pending Utox.     #Psychosis, unspecified; mood disorder NOS  -c/w zoloft 50 mg daily  -c/w risperdal 0.5 mg bid --> titrate risperdal 1 mg bid (4/14)  -start hydroxyzine 25 mg q6h prn for anxiety/insomnia  -first break psychosis workup: Head CT (neg); pending RPR/HIV, b12, folate, IBETH, ESR, TSH, urine drug screen    #HLD  -medicine consulted    #Agitation  -for agitation not amenable for verbal redirection, may give haldol 5 mg q6h prn with escalation to IM if pt is a danger to self or/and others with repeat EKG to ensure QTc <500 ms

## 2021-04-15 NOTE — PROGRESS NOTE BEHAVIORAL HEALTH - NSBHFUPINTERVALHXFT_PSY_A_CORE
( ID #[id number], Rachid)    Pt seen and evaluated during treatment team, chart reviewed. As per nursing report, no acute events overnight, no PRNs. On evaluation, pt presents calm and cooperative, well-groomed. He reports mood "better", reports resolution of "sadness" and SI from admission. He attributes improvement to feeling safer on the unit. He states the fear he felt prior to admission has come down, states "they" who were previously following him are now "less". He continues with delusion that his phone was hacked and that he was being followed by "them" prior to admission, states he plans to get a new phone. He reports good sleep and appetite. He denies AVH. Denies SI/HI, intent and plan. Pt adherent to medications, denies negative side effects. Pt in good behavioral control. Encouraged groups/DBT. Spoke with pt's son - he states pt with pattern of frequent new phone numbers and different addresses for last several years, also reports pt recently met a woman in  who he has been sending money/appliances to. When evaluated on claims, pt states he moved to different apartments d/t problems of noise/rent/parking, states he got new phones because previous phones would not work properly; pt denies prior episodes of paranoia.   Pt denies s/sx of Covid-19.

## 2021-04-16 PROCEDURE — 99231 SBSQ HOSP IP/OBS SF/LOW 25: CPT

## 2021-04-16 RX ADMIN — RISPERIDONE 1 MILLIGRAM(S): 4 TABLET ORAL at 08:41

## 2021-04-16 RX ADMIN — RISPERIDONE 1 MILLIGRAM(S): 4 TABLET ORAL at 20:34

## 2021-04-16 RX ADMIN — SERTRALINE 50 MILLIGRAM(S): 25 TABLET, FILM COATED ORAL at 08:40

## 2021-04-16 NOTE — CONSULT NOTE ADULT - SUBJECTIVE AND OBJECTIVE BOX
AMANDA CHRISTOPHER  65y, Male  Allergy: No Known Allergies      CHIEF COMPLAINT: Pt admitted paranoid delusions of being watched, followed around and having his phone bugged. (12 Apr 2021 19:18)      LOS  4d    HPI:  Pt is a 65M w/o significant PMH being admitted to [facility] for new paranoia and SI w/o known psychiatric history. Denies HA, dizziness, fever/chills, cough, SOB, chest pain, NVD, abdominal pain, change in urination and change in BM.       HPI as per ED psych: "Pt is a 66yo Belizean-speaking male employed as , domiciled alone, with no known medical history, no known psych history, presenting with family for new onset/worsening paranoia and thoughts of suicide over the past month.     Used  services for interview with patient.    Pt reports that he has been feeling very "fearful and having lots of concerns" in general, acknowledges his family has been concerned about him as well. 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 Feels like the bus is "like an airplane, going up into the air" - repeatedly states these experiences have made him very stressed, not sleeping well, not eating well. Reports these have been ongoing for about 1-2 months. States "they" have been taking pictures of him, taking pictures of his license plate, feels he has been "hacked." Believes that "they put something in my car when I am parked to detect where I am at." When these thoughts overwhelm him, he feels suicidal. He has been thinking about jumping off the bridge to end his life for about 1-2 months. Every time he goes past a bridge, he feels "they are taking pictures of me." Denies that he has intent to do this but does think about this frequently. Feels very hopeless and helpless.     He suddenly begins to talk about feeling "calm and inner peace," because "God is calling me" and telling him "don't worry." He feels God calls him to "go up with him into the tonie." Feels he is ready to join God in heaven, feels he is ready to die. Reciting Bible verses during interview. " (12 Apr 2021 19:04)      INFECTIOUS DISEASE HISTORY:  ID consulted for positive RPR  RPR level 1:1    PAST MEDICAL & SURGICAL HISTORY:  No pertinent past medical history    History of torn meniscus of knee  Hx repair to L knee        FAMILY HISTORY  No pertinent family history in first degree relatives        SOCIAL HISTORY  Social History:  Denies smoking, drinking and drug use (12 Apr 2021 19:04)        ROS  General: Denies rigors, nightsweats  HEENT: Denies headache, rhinorrhea, sore throat, eye pain  CV: Denies CP, palpitations  PULM: Denies wheezing, hemoptysis  GI: Denies hematemesis, hematochezia, melena  : Denies discharge, hematuria  MSK: Denies arthralgias, myalgias  SKIN: Denies rash, lesions  NEURO: Denies paresthesias, weakness  PSYCH: Denies depression, anxiety    VITALS:  T(F): 97.5, Max: 97.5 (04-16-21 @ 09:35)  HR: 76  BP: 115/70  RR: 18Vital Signs Last 24 Hrs  T(C): 36.4 (16 Apr 2021 09:37), Max: 36.4 (16 Apr 2021 09:35)  T(F): 97.5 (16 Apr 2021 09:37), Max: 97.5 (16 Apr 2021 09:35)  HR: 76 (16 Apr 2021 09:37) (68 - 76)  BP: 115/70 (16 Apr 2021 09:37) (114/55 - 127/59)  BP(mean): --  RR: 18 (16 Apr 2021 09:37) (15 - 18)  SpO2: --    PHYSICAL EXAM:  Gen: NAD, resting in bed  HEENT: Normocephalic, atraumatic  Neck: supple, no lymphadenopathy  CV: Regular rate & regular rhythm  Lungs: decreased BS at bases, no fremitus  Abdomen: Soft, BS present  Ext: Warm, well perfused  Neuro: non focal, awake  Skin: no rash, no erythema  Lines: no phlebitis    TESTS & MEASUREMENTS:                  Culture - Urine (collected 04-12-21 @ 04:30)  Source: .Urine Clean Catch (Midstream)  Final Report (04-13-21 @ 11:50):    No growth            INFECTIOUS DISEASES TESTING  HIV-1/2 Combo Result: Nonreact (04-14-21 @ 07:20)      RADIOLOGY & ADDITIONAL TESTS:  I have personally reviewed the last Chest xray  CXR      CT      CARDIOLOGY TESTING  12 Lead ECG:   Ventricular Rate 54 BPM    Atrial Rate 54 BPM    P-R Interval 180 ms    QRS Duration 90 ms    Q-T Interval 394 ms    QTC Calculation(Bazett) 373 ms    P Axis 35 degrees    R Axis 40 degrees    T Axis 15 degrees    Diagnosis Line Sinus bradycardia  Otherwise normal ECG    Confirmed by Shamar Patrick ([id number]) on 4/12/2021 7:31:49 AM (04-11-21 @ 23:24)      MEDICATIONS  risperiDONE   Tablet 1 Oral two times a day  sertraline 50 Oral daily      Weight  Weight (kg): 108.3 (04-12-21 @ 19:01)    ANTIBIOTICS:      ALLERGIES:  No Known Allergies    
65M w/o significant PMH being admitted to [facility] for new paranoia and SI w/o known psychiatric history.     Today:  Seen at bedside, no complaints.            REVIEW OF SYSTEMS:    CONSTITUTIONAL: No fever, weight loss, or fatigue  EYES: No eye pain, visual disturbances, or discharge  ENMT:  No difficulty hearing, tinnitus, vertigo; No sinus or throat pain  NECK: No pain or stiffness  RESPIRATORY: No cough, wheezing, chills or hemoptysis; No shortness of breath  CARDIOVASCULAR: No chest pain, palpitations, dizziness, or leg swelling  GASTROINTESTINAL: No abdominal or epigastric pain. No nausea, vomiting, or hematemesis; No diarrhea or constipation. No melena or hematochezia.  GENITOURINARY: No dysuria, frequency, hematuria, or incontinence  NEUROLOGICAL: No headaches, memory loss, loss of strength, numbness, or tremors  SKIN: No itching, burning, rashes, or lesions   LYMPH NODES: No enlarged glands  ENDOCRINE: No heat or cold intolerance; No hair loss  MUSCULOSKELETAL: No joint pain or swelling; No muscle, back, or extremity pain  HEME/LYMPH: No easy bruising, or bleeding gums  ALLERGY AND IMMUNOLOGIC: No hives or eczema      MEDICATIONS  (STANDING):  risperiDONE   Tablet 0.5 milliGRAM(s) Oral two times a day  sertraline 50 milliGRAM(s) Oral daily    MEDICATIONS  (PRN):  haloperidol     Tablet 5 milliGRAM(s) Oral every 6 hours PRN agitation  hydrOXYzine hydrochloride 50 milliGRAM(s) Oral every 6 hours PRN anxiety/insomnia  LORazepam     Tablet 1 milliGRAM(s) Oral every 6 hours PRN agitation      Allergies  No Known Allergies        FAMILY HISTORY:  No pertinent family history in first degree relatives        Vital Signs Last 24 Hrs  T(C): 36.1 (2021 08:21), Max: 36.4 (2021 15:35)  T(F): 96.9 (2021 08:21), Max: 97.6 (2021 15:35)  HR: 63 (2021 08:21) (63 - 74)  BP: 117/57 (2021 08:21) (102/53 - 140/72)  RR: 18 (2021 08:21) (18 - 18)  SpO2: 96% (2021 15:35) (96% - 96%)    PHYSICAL EXAM:    GENERAL: NAD, well-groomed, well-developed  HEAD:  Atraumatic, Normocephalic  EYES: EOMI, PERRLA, conjunctiva and sclera clear  ENMT: No tonsillar erythema, exudates, or enlargement; Moist mucous membranes, Good dentition, No lesions  NECK: Supple, No JVD, Normal thyroid  NERVOUS SYSTEM:  Alert & Oriented X3, Good concentration  CHEST/LUNG: Clear to percussion bilaterally; No rales, rhonchi, wheezing, or rubs  HEART: Regular rate and rhythm; No murmurs, rubs, or gallops  ABDOMEN: Soft, Nontender, Nondistended; Bowel sounds present  EXTREMITIES:  2+ Peripheral Pulses, No clubbing, cyanosis, or edema  SKIN: No rashes or lesions      LABS:                        13.5   5.93  )-----------( 158      ( 2021 22:47 )             41.2     04-11    139  |  103  |  17  ----------------------------<  107<H>  4.8   |  27  |  1.2    Ca    9.5      2021 22:47        Urinalysis Basic - ( 2021 04:30 )    Color: Light Yellow / Appearance: Clear / S.016 / pH: x  Gluc: x / Ketone: Negative  / Bili: Negative / Urobili: <2 mg/dL   Blood: x / Protein: Negative / Nitrite: Negative   Leuk Esterase: Negative / RBC: x / WBC x   Sq Epi: x / Non Sq Epi: x / Bacteria: x

## 2021-04-16 NOTE — CONSULT NOTE ADULT - TIME BILLING
Direct patient care.
I have personally seen and examined this patient.    I have reviewed all pertinent clinical information and reviewed all relevant imaging and diagnostic studies personally.   I counseled the patient about diagnostic testing and treatment plan. All questions were answered.   I discussed recommendations with the primary team.

## 2021-04-16 NOTE — CONSULT NOTE ADULT - ASSESSMENT
ASSESSMENT  65M w/o significant PMH being admitted to [facility] for new paranoia and SI w/o known psychiatric history.      IMPRESSION  #Paranoia/SI  #Positive RPR  - HIV negative  #Obesity BMI (kg/m2): 33.3  #Abx allergy: NKDA    RECOMMENDATIONS  This is a preliminary incomplete pended note, all final recommendations to follow after interview and examination of the patient.    Spectra [phone]       ASSESSMENT  65M w/o significant PMH being admitted to [facility] for new paranoia and SI w/o known psychiatric history.      IMPRESSION  #Paranoia/SI  #Positive RPR  - HIV negative  #Obesity BMI (kg/m2): 33.3  #Abx allergy: NKDA    RECOMMENDATIONS  - denies hx of previous treatment -- RPR 1:1 is minimally reactive  - will check [facility] Syphilis registry for any hx of previous testing  - no need for urgent treatment at this point  - will follow-up as outpatient    Please call or message on [employer] Teams if with any questions.  Spectra [phone]

## 2021-04-16 NOTE — PROGRESS NOTE BEHAVIORAL HEALTH - SUMMARY
66 y/o Thai-speaking male, domiciled alone, , adult son, employed as , no known PMH, no PPH, BIB son for new onset/worsening paranoia for 2 months and recent SI. His presentation of psychosis remains unclear at this time d/t his advanced age and no prior psychiatric hx as confirmed by his son. Pt endorses depressive sx 2/2 paranoia. R/o organic etiology, pending labs. R/o substance-induced, pt denies, pending Utox.     #Psychosis, unspecified; mood disorder NOS  -c/w zoloft 50 mg daily  -c/w risperdal 0.5 mg bid --> titrate risperdal 1 mg bid (4/14)  -start hydroxyzine 25 mg q6h prn for anxiety/insomnia  -first break psychosis workup: Head CT (neg); pending RPR/HIV, b12, folate, IBETH, ESR, TSH, urine drug screen    #HLD  -medicine consulted    #Positive RPR  -pt denies prior dx/tx  -ID consult pending    #Agitation  -for agitation not amenable for verbal redirection, may give haldol 5 mg q6h prn with escalation to IM if pt is a danger to self or/and others with repeat EKG to ensure QTc <500 ms 64 y/o Kyrgyz-speaking male, domiciled alone, , adult son, employed as , no known PMH, no PPH, BIB son for new onset/worsening paranoia for 2 months and recent SI. His presentation of psychosis remains unclear at this time d/t his advanced age and no prior psychiatric hx as confirmed by his son. Pt endorses depressive sx 2/2 paranoia. R/o organic etiology; of note, pt +RPR, pending ID consult.     #Psychosis, unspecified; mood disorder NOS  -c/w zoloft 50 mg daily  -c/w risperdal 0.5 mg bid --> titrate risperdal 1 mg bid (4/14)  -start hydroxyzine 25 mg q6h prn for anxiety/insomnia  -first break psychosis workup: Head CT (neg); HIV, b12, folate, IBETH, ESR, TSH, urine drug screen WNL; ** postitive RPR    #HLD  -medicine consulted    #Positive RPR  -pt denies prior dx/tx  -ID consult pending    #Agitation  -for agitation not amenable for verbal redirection, may give haldol 5 mg q6h prn with escalation to IM if pt is a danger to self or/and others with repeat EKG to ensure QTc <500 ms

## 2021-04-16 NOTE — PROGRESS NOTE BEHAVIORAL HEALTH - NSBHFUPINTERVALHXFT_PSY_A_CORE
( ID #[id number], [place])    Pt seen and evaluated, chart reviewed. As per nursing report, no acute events overnight, no PRNs. On evaluation, pt presents calm and cooperative, well-groomed. He reports mood "good, I'm blessed to be alive", continues to report resolution of depression and SI from admission. He endorses good sleep and appetite. He denies AVH. Denies paranoia, however noted to be suspicious concerning unit phone, repeatedly asking whether phone is being monitored. When evaluated on being followed by "them", pt denies currently, continues with delusion he as being followed before admission. Pt mostly isolative to room. Denies SI/HI, intent and plan. Pt adherent to medications, denies negative side effects. Pt in good behavioral control. Of note, +RPR, pt denies prior dx/tx, pending ID consult.   Pt denies s/sx of Covid-19.

## 2021-04-17 LAB — SARS-COV-2 RNA SPEC QL NAA+PROBE: SIGNIFICANT CHANGE UP

## 2021-04-17 PROCEDURE — 99231 SBSQ HOSP IP/OBS SF/LOW 25: CPT

## 2021-04-17 RX ADMIN — RISPERIDONE 1 MILLIGRAM(S): 4 TABLET ORAL at 08:26

## 2021-04-17 RX ADMIN — Medication 50 MILLIGRAM(S): at 20:45

## 2021-04-17 RX ADMIN — RISPERIDONE 1 MILLIGRAM(S): 4 TABLET ORAL at 20:45

## 2021-04-17 RX ADMIN — SERTRALINE 50 MILLIGRAM(S): 25 TABLET, FILM COATED ORAL at 08:26

## 2021-04-17 NOTE — PROGRESS NOTE BEHAVIORAL HEALTH - NSBHFUPINTERVALHXFT_PSY_A_CORE
Chart reviewed, nursing report discussed, pt was interviewed and assessed.    Pt was lying in bed, awake and alert, oriented x 4. Pt was easily engaged, calm, good eye-contact. Pt denies having suicidal ideation, stating he feels much better. His son supports him.    Pt complies with medications, does not complain of side effects. Individual supportive  is provided.

## 2021-04-17 NOTE — PROGRESS NOTE BEHAVIORAL HEALTH - NSBHADMITCOUNSEL_PSY_A_CORE
diagnostic results/impressions and/or recommended studies/risks and benefits of treatment options/instructions for management, treatment and follow up

## 2021-04-18 RX ADMIN — SERTRALINE 50 MILLIGRAM(S): 25 TABLET, FILM COATED ORAL at 08:06

## 2021-04-18 RX ADMIN — RISPERIDONE 1 MILLIGRAM(S): 4 TABLET ORAL at 08:06

## 2021-04-18 RX ADMIN — RISPERIDONE 1 MILLIGRAM(S): 4 TABLET ORAL at 20:18

## 2021-04-19 PROCEDURE — 99231 SBSQ HOSP IP/OBS SF/LOW 25: CPT

## 2021-04-19 RX ORDER — RISPERIDONE 4 MG/1
2 TABLET ORAL AT BEDTIME
Refills: 0 | Status: DISCONTINUED | OUTPATIENT
Start: 2021-04-19 | End: 2021-04-23

## 2021-04-19 RX ORDER — RISPERIDONE 4 MG/1
1 TABLET ORAL DAILY
Refills: 0 | Status: DISCONTINUED | OUTPATIENT
Start: 2021-04-20 | End: 2021-04-23

## 2021-04-19 RX ADMIN — SERTRALINE 50 MILLIGRAM(S): 25 TABLET, FILM COATED ORAL at 08:10

## 2021-04-19 RX ADMIN — RISPERIDONE 2 MILLIGRAM(S): 4 TABLET ORAL at 20:23

## 2021-04-19 RX ADMIN — RISPERIDONE 1 MILLIGRAM(S): 4 TABLET ORAL at 08:10

## 2021-04-19 NOTE — PROGRESS NOTE BEHAVIORAL HEALTH - SUMMARY
66 y/o Macedonian-speaking male, domiciled alone, , adult son, employed as , no known PMH, no PPH, BIB son for new onset/worsening paranoia for 2 months and recent SI. His presentation of psychosis remains unclear at this time d/t his advanced age and no prior psychiatric hx as confirmed by his son. Pt endorses depressive sx 2/2 paranoia. R/o organic etiology; of note, pt +RPR, pending ID consult.     #Psychosis, unspecified; mood disorder NOS  -c/w zoloft 50 mg daily  -c/w risperdal 0.5 mg bid --> titrate risperdal 1 mg bid (4/14) --> titrate risperdal 1 mg daily, risperdal 2 mg qhs (4/19)  -start hydroxyzine 25 mg q6h prn for anxiety/insomnia  -first break psychosis workup: Head CT (neg); HIV, b12, folate, IBETH, ESR, TSH, urine drug screen WNL; ** postitive RPR    #HLD  -medicine consulted    #Positive RPR  -pt denies prior dx/tx  -ID consulted --> rec to f/u OP    #Agitation  -for agitation not amenable for verbal redirection, may give haldol 5 mg q6h prn with escalation to IM if pt is a danger to self or/and others with repeat EKG to ensure QTc <500 ms

## 2021-04-19 NOTE — PROGRESS NOTE BEHAVIORAL HEALTH - NSBHFUPINTERVALHXFT_PSY_A_CORE
( ID #[id number], [place]ama)    Pt seen and evaluated, chart reviewed. As per nursing report, no acute events over the weekend, no PRNs. On evaluation, pt reports mood "good", states he is no longer depressed, denies continued SI stating he is blessed to be alive. He reports good sleep and appetite. He denies AVH. Pt noted to be suspicious of staff checking his room doing routine checks; pt educated on unit procedures, pt continues to repeatedly ask for a "mini camera" to monitor his door. Pt mostly isolative to room despite encouragement to join groups. Denies SI/HI, intent and plan. Pt adherent to medications, denies negative side effects. Of note, +RPR, pt denies prior dx/tx, ID consulted with recs to f/u OP.   Pt denies s/sx of Covid-19.

## 2021-04-20 PROCEDURE — 99231 SBSQ HOSP IP/OBS SF/LOW 25: CPT

## 2021-04-20 RX ADMIN — RISPERIDONE 2 MILLIGRAM(S): 4 TABLET ORAL at 20:28

## 2021-04-20 RX ADMIN — RISPERIDONE 1 MILLIGRAM(S): 4 TABLET ORAL at 08:38

## 2021-04-20 RX ADMIN — Medication 50 MILLIGRAM(S): at 20:28

## 2021-04-20 RX ADMIN — SERTRALINE 50 MILLIGRAM(S): 25 TABLET, FILM COATED ORAL at 08:38

## 2021-04-20 NOTE — PROGRESS NOTE BEHAVIORAL HEALTH - NSBHFUPINTERVALHXFT_PSY_A_CORE
( ID #[id number], [place])    Pt seen and evaluated, chart reviewed. As per nursing report, no acute events overnight, no PRNs. On evaluation, pt reports mood "good", continues to report resolution of depression and SI from admission, states "I am blessed". He reports good sleep and appetite. He denies AVH. Of note, pt suspicious of staff checking his room doing routine checks yesterday; today, pt states "they check to make sure people don't kill themselves, I'm not going to kill myself". He denies need for a mini camera as requested yesterday, appears less paranoid. Denies SI/HI, intent and plan. Pt appears future-oriented, reports looking forward to going home and to enjoy the spring weather. Pt adherent to medications, denies negative side effects. Pt remains mostly isolative to room, encouraged groups.   Pt denies s/sx of Covid-19. ( ID #[id number], [place] / ID #[id number], [place]ala)    Pt seen and evaluated, chart reviewed. As per nursing report, no acute events overnight, no PRNs. On evaluation, pt reports mood "good", continues to report resolution of depression and SI from admission, states "I am blessed". He reports good sleep and appetite. He denies AVH. Of note, pt suspicious of staff checking his room doing routine checks yesterday; today, pt states "they check to make sure people don't kill themselves, I'm not going to kill myself". He denies need for a mini camera as requested yesterday, appears less paranoid. Denies SI/HI, intent and plan. Pt appears future-oriented, reports looking forward to going home and to enjoy the spring weather. Pt adherent to medications, denies negative side effects. Pt remains mostly isolative to room, encouraged groups. MOCA 24.  Pt denies s/sx of Covid-19.

## 2021-04-20 NOTE — PROGRESS NOTE BEHAVIORAL HEALTH - SUMMARY
64 y/o Hungarian-speaking male, domiciled alone, , adult son, employed as , no known PMH, no PPH, BIB son for new onset/worsening paranoia for 2 months and recent SI. His presentation of psychosis remains unclear at this time d/t his advanced age and no prior psychiatric hx as confirmed by his son. Pt endorses depressive sx 2/2 paranoia. R/o organic etiology; of note, pt +RPR, ID recs to f/u OP d/t low titer 1:1.     #Psychosis, unspecified; mood disorder NOS  -c/w zoloft 50 mg daily  -c/w risperdal 0.5 mg bid --> titrate risperdal 1 mg bid (4/14) --> titrate risperdal 1 mg daily, risperdal 2 mg qhs (4/19)  -start hydroxyzine 25 mg q6h prn for anxiety/insomnia  -first break psychosis workup: Head CT (neg); HIV, b12, folate, IBETH, ESR, TSH, urine drug screen WNL; ** postitive RPR    #HLD  -medicine consulted    #Positive RPR  -pt denies prior dx/tx  -ID consulted --> rec to f/u OP    #Agitation  -for agitation not amenable for verbal redirection, may give haldol 5 mg q6h prn with escalation to IM if pt is a danger to self or/and others with repeat EKG to ensure QTc <500 ms

## 2021-04-21 LAB — SARS-COV-2 RNA SPEC QL NAA+PROBE: SIGNIFICANT CHANGE UP

## 2021-04-21 PROCEDURE — 99231 SBSQ HOSP IP/OBS SF/LOW 25: CPT

## 2021-04-21 RX ADMIN — RISPERIDONE 2 MILLIGRAM(S): 4 TABLET ORAL at 20:12

## 2021-04-21 RX ADMIN — RISPERIDONE 1 MILLIGRAM(S): 4 TABLET ORAL at 09:08

## 2021-04-21 RX ADMIN — SERTRALINE 50 MILLIGRAM(S): 25 TABLET, FILM COATED ORAL at 09:08

## 2021-04-21 NOTE — PROGRESS NOTE BEHAVIORAL HEALTH - NSBHFUPINTERVALHXFT_PSY_A_CORE
(, Keri, ID #[id number])    Pt seen and evaluated, chart reviewed. As per nursing report, no acute events overnight. On evaluation, pt reports good mood, sleep and appetite. He endorses improved anxiety, states "I'm not scared anymore", reports yesterday's paranoia of needing a camera is no longer needed. He denies AVH, delusions. Denies SI/HI, intent and plan. Pt appears future-oriented, endorses looking forward to going home and continuing psychotherapy OP. Pt adherent to medications, denies negative side effects. Pt in good behavioral control.   Pt denies s/sx of Covid-19.

## 2021-04-21 NOTE — PROGRESS NOTE BEHAVIORAL HEALTH - SUMMARY
66 y/o Latvian-speaking male, domiciled alone, , adult son, employed as , no known PMH, no PPH, BIB son for new onset/worsening paranoia for 2 months and recent SI. His presentation of psychosis remains unclear at this time d/t his advanced age and no prior psychiatric hx as confirmed by his son. Pt endorses depressive sx 2/2 paranoia. R/o organic etiology; of note, pt +RPR, ID recs to f/u OP d/t low titer 1:1. Pt's MOCA 24, noted mild cognitive impairment.     #Psychosis, unspecified; mood disorder NOS  -c/w zoloft 50 mg daily  -c/w risperdal 0.5 mg bid --> titrate risperdal 1 mg bid (4/14) --> titrate risperdal 1 mg daily, risperdal 2 mg qhs (4/19)  -start hydroxyzine 25 mg q6h prn for anxiety/insomnia  -first break psychosis workup: Head CT (neg); HIV, b12, folate, IBETH, ESR, TSH, urine drug screen WNL; ** postitive RPR    #HLD  -medicine consulted    #Positive RPR  -pt denies prior dx/tx  -ID consulted --> rec to f/u OP    #Agitation  -for agitation not amenable for verbal redirection, may give haldol 5 mg q6h prn with escalation to IM if pt is a danger to self or/and others with repeat EKG to ensure QTc <500 ms

## 2021-04-22 PROBLEM — Z78.9 OTHER SPECIFIED HEALTH STATUS: Chronic | Status: ACTIVE | Noted: 2021-04-12

## 2021-04-22 PROCEDURE — 99231 SBSQ HOSP IP/OBS SF/LOW 25: CPT

## 2021-04-22 RX ADMIN — SERTRALINE 50 MILLIGRAM(S): 25 TABLET, FILM COATED ORAL at 08:13

## 2021-04-22 RX ADMIN — RISPERIDONE 2 MILLIGRAM(S): 4 TABLET ORAL at 20:05

## 2021-04-22 RX ADMIN — RISPERIDONE 1 MILLIGRAM(S): 4 TABLET ORAL at 08:13

## 2021-04-22 NOTE — DISCHARGE NOTE BEHAVIORAL HEALTH - HPI (INCLUDE ILLNESS QUALITY, SEVERITY, DURATION, TIMING, CONTEXT, MODIFYING FACTORS, ASSOCIATED SIGNS AND SYMPTOMS)
66yo Arabic-speaking male employed as , domiciled alone, with no known medical history, no known psych history, presenting with family for new onset/worsening paranoia and thoughts of suicide over the past month. Used  services for interview with patient.    Pt reports that he has been feeling very "fearful and having lots of concerns" in general, acknowledges his family has been concerned about him as well. 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 Feels like the bus is "like an airplane, going up into the air" - repeatedly states these experiences have made him very stressed, not sleeping well, not eating well. Reports these have been ongoing for about 1-2 months. States "they" have been taking pictures of him, taking pictures of his license plate, feels he has been "hacked." Believes that "they put something in my car when I am parked to detect where I am at." When these thoughts overwhelm him, he feels suicidal. He has been thinking about jumping off the bridge to end his life for about 1-2 months. Every time he goes past a bridge, he feels "they are taking pictures of me." Denies that he has intent to do this but does think about this frequently. Feels very hopeless and helpless. He suddenly begins to talk about feeling "calm and inner peace," because "God is calling me" and telling him "don't worry." He feels God calls him to "go up with him into the tonie." Feels he is ready to join God in heaven, feels he is ready to die. Reciting Bible verses during interview. He denies HI. Denies AVH. Denies manic symptoms. Denies substance use. Never seen a psychiatrist or therapist in past.    Collateral obtained from ED writer - Scott Jung., son - states that family reported to him recently that patient had started acting bizarrely about a month ago. Patient was reporting hallucinations that his bus had wings and could fly, he was talking about people following him and taking pictures and that his phone was hacked. When speaking on the phone he was very short and has been guarded with family. Patient reported to his sister that he was having thoughts of killing himself by jumping off the [facility] [place]. Despite ED triage documentation that son reported patient attempted suicide last week son denies that patient did anything to try and hurt himself recently or in the past. Son got involved today with patient and brought him to the ED for evaluation. States patient has no psychiatric hx or known medical issues, no substance abuse hx, no known family hx of mental health. Son is supportive of admission at this time for stabilization and safety of patient. SUICIDALITY: Voiced to family SI with thoughts of jumping off [place] bridge. 66yo Pashto-speaking male employed as , domiciled alone, with no known medical history, no known psych history, presenting with family for new onset/worsening paranoia and thoughts of suicide over the past month. Used  services for interview with patient.    Pt reports that he has been feeling very "fearful and having lots of concerns" in general, acknowledges his family has been concerned about him as well. 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 Feels like the bus is "like an airplane, going up into the air" - repeatedly states these experiences have made him very stressed, not sleeping well, not eating well. Reports these have been ongoing for about 1-2 months. States "they" have been taking pictures of him, taking pictures of his license plate, feels he has been "hacked." Believes that "they put something in my car when I am parked to detect where I am at." When these thoughts overwhelm him, he feels suicidal. He has been thinking about jumping off the bridge to end his life for about 1-2 months. Every time he goes past a bridge, he feels "they are taking pictures of me." Denies that he has intent to do this but does think about this frequently. Feels very hopeless and helpless. He suddenly begins to talk about feeling "calm and inner peace," because "God is calling me" and telling him "don't worry." He feels God calls him to "go up with him into the tonie." Feels he is ready to join God in heaven, feels he is ready to die. Reciting Bible verses during interview. He denies HI. Denies AVH. Denies manic symptoms. Denies substance use. Never seen a psychiatrist or therapist in past.    Collateral obtained from ED writer - Scott Jung., son - states that family reported to him recently that patient had started acting bizarrely about a month ago. Patient was reporting hallucinations that his bus had wings and could fly, he was talking about people following him and taking pictures and that his phone was hacked. When speaking on the phone he was very short and has been guarded with family. Patient reported to his sister that he was having thoughts of killing himself by jumping off the [facility] Bridge. Despite ED triage documentation that son reported patient attempted suicide last week son denies that patient did anything to try and hurt himself recently or in the past. Son got involved today with patient and brought him to the ED for evaluation. States patient has no psychiatric hx or known medical issues, no substance abuse hx, no known family hx of mental health. Son is supportive of admission at this time for stabilization and safety of patient. SUICIDALITY: Voiced to family SI with thoughts of jumping off [place].    In [place], he reports that "they" hacked his phone, reports his cellphone has been acting up and he read online that "they" could have hacked it. When evaluated on "they", pt endorses that "they" are following him when he drives around, states they have been trying to taking his pictures, states "I saw [employer], they are from [place]". Pt is unable to provide further details, preservative that he is being hacked, followed and monitored. He reports paranoia began 2 months ago, reports distress, states his feelings of being hacked and followed caused his depression and SI prior to admission. He is unable to identify a stressor/trigger 2 months ago before onset of paranoia, denies changes to his mood and sleep prior to onset. He denies prior s/sx of depression and gregorio. He reports d/t paranoia, he is now having low mood a/w poor sleep and anxiety. 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 Denies SI/HI, intent and plan. He denies hx and recent use of all substances. He denies all psychiatric hx, reports no previous trials of psychotropics/prior OP tx/prior hospitalizations/SA/NSSIB. Denies recent memory issues, AOx4.    Obtained collateral from pt's sonScott - corroborates pt's report, states pt has no psychiatric hx, states pt with no observed hx of depression, gregorio or/and psychosis. States pt lives alone in [place], states no one sees or/and talks with pt daily, states pt's siblings live in [place] but are "not close". He states family began noticing pt making bizarre statements a month ago with his bus having wings. He states last week pt visited him and he told him that he was being hacked and followed/monitored. He states over the weekend pt voiced to his aunt that he was depressed and had SI, which is when he went to see pt and brought him to the ED for an evaluation.

## 2021-04-22 NOTE — DISCHARGE NOTE BEHAVIORAL HEALTH - NSBHDCMEDSFT_PSY_A_CORE
Started on zoloft 50 mg daily. Started on risperdal 0.5 mg bid, titrated to risperdal 1 mg daily, risperdal 2 mg qhs. Pt tolerated medications well, denied negative side effects.

## 2021-04-22 NOTE — PROGRESS NOTE BEHAVIORAL HEALTH - THOUGHT PROCESS
Linear/Perseverative
Overinclusive/Perseverative

## 2021-04-22 NOTE — PROGRESS NOTE BEHAVIORAL HEALTH - THOUGHT CONTENT
Preoccupations/Ruminations/Other
Ruminations/Other
Unremarkable
Other
Ruminations/Other
Ruminations/Other
Other
Other

## 2021-04-22 NOTE — PROGRESS NOTE BEHAVIORAL HEALTH - NS ED BHA MSE GENERAL APPEARANCE
No deformities present

## 2021-04-22 NOTE — PROGRESS NOTE BEHAVIORAL HEALTH - AFFECT QUALITY
Anxious/Euthymic
Euthymic
Anxious/Euthymic
Anxious/Euthymic

## 2021-04-22 NOTE — DISCHARGE NOTE BEHAVIORAL HEALTH - NSBHDCSWCOMMENTSFT_PSY_A_CORE
Discharge summary to be faxed to [phone] Discharge summary faxed to [facility] [phone] on 4/23 at 3pm

## 2021-04-22 NOTE — DISCHARGE NOTE BEHAVIORAL HEALTH - NSBHDCSUICPROTECTFT_PSY_A_CORE
Supportive family, medication and treatment compliance, future orientation, ability to state reasons for living, improved insight, willingness to seek care in moment of crisis

## 2021-04-22 NOTE — PROGRESS NOTE BEHAVIORAL HEALTH - RECENT MEMORY
Patient's father Soto Dejesus [phone].  said his daughter's 428 Margi Polanco wants her on the Cymbalta for her pain, but insurance is denying it. Requested Prescriptions     Pending Prescriptions Disp Refills    DULoxetine (CYMBALTA) 30 mg capsule 30 Cap 1     Sig: Take 1 Cap by mouth daily.
Normal

## 2021-04-22 NOTE — PROGRESS NOTE BEHAVIORAL HEALTH - BODY HABITUS
Average build

## 2021-04-22 NOTE — PROGRESS NOTE BEHAVIORAL HEALTH - NSBHFUPINTERVALCCFT_PSY_A_CORE
"I am not suicidal anymore"
"I feel free, I am good"
"I'm good"
"I feel better"
"I'm good"
"Can I have a mini camera"
"I'm blessed to be alive"
"I'm ok"
"My phone was hacked, I read it online, they are following me and taking pictures"

## 2021-04-22 NOTE — PROGRESS NOTE BEHAVIORAL HEALTH - RISK ASSESSMENT
Risk factors include recent SI with thoughts of jumping off bridge, active psychosis, poor insight. Protective factors include social support, residential stability, employment stability, sobriety, willingness to utilize crisis services/ED in times of crisis.
Risk factors include recent SI and psychosis. Protective factors include social support, residential stability, employment stability, sobriety, improved insight, ability to verbalize reasons to live, willingness to utilize crisis services/ED in times of crisis.
Risk factors include recent SI with thoughts of jumping off bridge, active psychosis, poor insight. Protective factors include social support, residential stability, employment stability, sobriety, willingness to utilize crisis services/ED in times of crisis.

## 2021-04-22 NOTE — DISCHARGE NOTE BEHAVIORAL HEALTH - MEDICATION SUMMARY - MEDICATIONS TO TAKE
I will START or STAY ON the medications listed below when I get home from the hospital:    sertraline 50 mg oral tablet  -- 1 tab(s) by mouth once a day x 14 days Continue to take as prescribed until told otherwise by your provider  -- Indication: For Depression    risperiDONE 2 mg oral tablet  -- 1 tab(s) by mouth once a day (at bedtime) x 14 days  Continue to take as prescribed until told otherwise by your provider   -- Indication: For Psychosis    risperiDONE 1 mg oral tablet  -- 1 tab(s) by mouth once a day x 14 days  Continue to take as prescribed until told otherwise by your provider  -- Indication: For Psychosis

## 2021-04-22 NOTE — PROGRESS NOTE BEHAVIORAL HEALTH - SUMMARY
66 y/o French-speaking male, domiciled alone, , adult son, employed as , no known PMH, no PPH, BIB son for new onset/worsening paranoia for 2 months and recent SI. His presentation of psychosis remains unclear at this time d/t his advanced age and no prior psychiatric hx as confirmed by his son. Pt endorses depressive sx 2/2 paranoia. R/o organic etiology; of note, pt +RPR, ID recs to f/u OP d/t low titer 1:1. Pt's MOCA 24, noted mild cognitive impairment.     #Psychosis, unspecified; mood disorder NOS  -c/w zoloft 50 mg daily  -c/w risperdal 0.5 mg bid --> titrate risperdal 1 mg bid (4/14) --> titrate risperdal 1 mg daily, risperdal 2 mg qhs (4/19)  -start hydroxyzine 25 mg q6h prn for anxiety/insomnia  -first break psychosis workup: Head CT (neg); HIV, b12, folate, IBETH, ESR, TSH, urine drug screen WNL; ** postitive RPR    #HLD  -medicine consulted    #Positive RPR  -pt denies prior dx/tx  -ID consulted --> rec to f/u OP    #Agitation  -for agitation not amenable for verbal redirection, may give haldol 5 mg q6h prn with escalation to IM if pt is a danger to self or/and others with repeat EKG to ensure QTc <500 ms

## 2021-04-22 NOTE — PROGRESS NOTE BEHAVIORAL HEALTH - NSBHCHARTREVIEWLAB_PSY_A_CORE FT
Complete Blood Count + Automated Diff (04.11.21 @ 22:47)   WBC Count: 5.93 K/uL   RBC Count: 4.70 M/uL   Hemoglobin: 13.5 g/dL   Hematocrit: 41.2 %   Mean Cell Volume: 87.7 fL   Mean Cell Hemoglobin: 28.7 pg   Mean Cell Hemoglobin Conc: 32.8 g/dL   Red Cell Distrib Width: 13.9 %   Platelet Count - Automated: 158: Results Verified K/uL   Auto Neutrophil #: 2.66 K/uL   Auto Lymphocyte #: 2.27 K/uL   Auto Monocyte #: 0.46 K/uL   Auto Eosinophil #: 0.46 K/uL   Auto Basophil #: 0.07 K/uL   Auto Neutrophil %: 44.7: Differential percentages must be correlated with absolute numbers for   clinical significance. %   Auto Lymphocyte %: 38.3 %   Auto Monocyte %: 7.8 %   Auto Eosinophil %: 7.8 %   Auto Basophil %: 1.2 %   Auto Immature Granulocyte %: 0.2 %   Nucleated RBC: 0 /100 WBCs   Basic Metabolic Panel (04.11.21 @ 22:47)   Sodium, Serum: 139 mmol/L   Potassium, Serum: 4.8: Hemolyzed. Interpret with caution mmol/L   Chloride, Serum: 103 mmol/L   Carbon Dioxide, Serum: 27 mmol/L   Anion Gap, Serum: 9 mmol/L   Blood Urea Nitrogen, Serum: 17 mg/dL   Creatinine, Serum: 1.2 mg/dL   Glucose, Serum: 107 mg/dL   Calcium, Total Serum: 9.5 mg/dL   eGFR if Non : 63  Lipid Profile (04.13.21 @ 04:30)   Cholesterol, Serum: 232 mg/dL   Triglycerides, Serum: 159 mg/dL   HDL Cholesterol, Serum: 37 mg/dL   Non HDL Cholesterol: 195  LDL Cholesterol Calculated: 176 mg/dL
Complete Blood Count + Automated Diff (04.11.21 @ 22:47)   WBC Count: 5.93 K/uL   RBC Count: 4.70 M/uL   Hemoglobin: 13.5 g/dL   Hematocrit: 41.2 %   Mean Cell Volume: 87.7 fL   Mean Cell Hemoglobin: 28.7 pg   Mean Cell Hemoglobin Conc: 32.8 g/dL   Red Cell Distrib Width: 13.9 %   Platelet Count - Automated: 158: Results Verified K/uL   Auto Neutrophil #: 2.66 K/uL   Auto Lymphocyte #: 2.27 K/uL   Auto Monocyte #: 0.46 K/uL   Auto Eosinophil #: 0.46 K/uL   Auto Basophil #: 0.07 K/uL   Auto Neutrophil %: 44.7  Auto Lymphocyte %: 38.3 %   Auto Monocyte %: 7.8 %   Auto Eosinophil %: 7.8 %   Auto Basophil %: 1.2 %   Auto Immature Granulocyte %: 0.2 %   Nucleated RBC: 0 /100 WBCs   Basic Metabolic Panel (04.11.21 @ 22:47)   Sodium, Serum: 139 mmol/L   Potassium, Serum: 4.8: Hemolyzed. Interpret with caution mmol/L   Chloride, Serum: 103 mmol/L   Carbon Dioxide, Serum: 27 mmol/L   Anion Gap, Serum: 9 mmol/L   Blood Urea Nitrogen, Serum: 17 mg/dL   Creatinine, Serum: 1.2 mg/dL   Glucose, Serum: 107 mg/dL   Calcium, Total Serum: 9.5 mg/dL   eGFR if Non : 63  Lipid Profile (04.13.21 @ 04:30)   Cholesterol, Serum: 232 mg/dL   Triglycerides, Serum: 159 mg/dL   HDL Cholesterol, Serum: 37 mg/dL   Non HDL Cholesterol: 195  LDL Cholesterol Calculated: 176 mg/dL  RPR Titer (04.14.21 @ 07:20) RPR Titer: 1:1   Rapid Plasma Reagin Assay (04.14.21 @ 07:20) Rapid Plasma Reagin Assay: Reactive
Complete Blood Count + Automated Diff (04.11.21 @ 22:47)   WBC Count: 5.93 K/uL   RBC Count: 4.70 M/uL   Hemoglobin: 13.5 g/dL   Hematocrit: 41.2 %   Mean Cell Volume: 87.7 fL   Mean Cell Hemoglobin: 28.7 pg   Mean Cell Hemoglobin Conc: 32.8 g/dL   Red Cell Distrib Width: 13.9 %   Platelet Count - Automated: 158: Results Verified K/uL   Auto Neutrophil #: 2.66 K/uL   Auto Lymphocyte #: 2.27 K/uL   Auto Monocyte #: 0.46 K/uL   Auto Eosinophil #: 0.46 K/uL   Auto Basophil #: 0.07 K/uL   Auto Neutrophil %: 44.7  Auto Lymphocyte %: 38.3 %   Auto Monocyte %: 7.8 %   Auto Eosinophil %: 7.8 %   Auto Basophil %: 1.2 %   Auto Immature Granulocyte %: 0.2 %   Nucleated RBC: 0 /100 WBCs   Basic Metabolic Panel (04.11.21 @ 22:47)   Sodium, Serum: 139 mmol/L   Potassium, Serum: 4.8: Hemolyzed. Interpret with caution mmol/L   Chloride, Serum: 103 mmol/L   Carbon Dioxide, Serum: 27 mmol/L   Anion Gap, Serum: 9 mmol/L   Blood Urea Nitrogen, Serum: 17 mg/dL   Creatinine, Serum: 1.2 mg/dL   Glucose, Serum: 107 mg/dL   Calcium, Total Serum: 9.5 mg/dL   eGFR if Non : 63  Lipid Profile (04.13.21 @ 04:30)   Cholesterol, Serum: 232 mg/dL   Triglycerides, Serum: 159 mg/dL   HDL Cholesterol, Serum: 37 mg/dL   Non HDL Cholesterol: 195  LDL Cholesterol Calculated: 176 mg/dL
Complete Blood Count + Automated Diff (04.11.21 @ 22:47)   WBC Count: 5.93 K/uL   RBC Count: 4.70 M/uL   Hemoglobin: 13.5 g/dL   Hematocrit: 41.2 %   Mean Cell Volume: 87.7 fL   Mean Cell Hemoglobin: 28.7 pg   Mean Cell Hemoglobin Conc: 32.8 g/dL   Red Cell Distrib Width: 13.9 %   Platelet Count - Automated: 158: Results Verified K/uL   Auto Neutrophil #: 2.66 K/uL   Auto Lymphocyte #: 2.27 K/uL   Auto Monocyte #: 0.46 K/uL   Auto Eosinophil #: 0.46 K/uL   Auto Basophil #: 0.07 K/uL   Auto Neutrophil %: 44.7  Auto Lymphocyte %: 38.3 %   Auto Monocyte %: 7.8 %   Auto Eosinophil %: 7.8 %   Auto Basophil %: 1.2 %   Auto Immature Granulocyte %: 0.2 %   Nucleated RBC: 0 /100 WBCs   Basic Metabolic Panel (04.11.21 @ 22:47)   Sodium, Serum: 139 mmol/L   Potassium, Serum: 4.8: Hemolyzed. Interpret with caution mmol/L   Chloride, Serum: 103 mmol/L   Carbon Dioxide, Serum: 27 mmol/L   Anion Gap, Serum: 9 mmol/L   Blood Urea Nitrogen, Serum: 17 mg/dL   Creatinine, Serum: 1.2 mg/dL   Glucose, Serum: 107 mg/dL   Calcium, Total Serum: 9.5 mg/dL   eGFR if Non : 63  Lipid Profile (04.13.21 @ 04:30)   Cholesterol, Serum: 232 mg/dL   Triglycerides, Serum: 159 mg/dL   HDL Cholesterol, Serum: 37 mg/dL   Non HDL Cholesterol: 195  LDL Cholesterol Calculated: 176 mg/dL  RPR Titer (04.14.21 @ 07:20) RPR Titer: 1:1   Rapid Plasma Reagin Assay (04.14.21 @ 07:20) Rapid Plasma Reagin Assay: Reactive
Complete Blood Count + Automated Diff (04.11.21 @ 22:47)   WBC Count: 5.93 K/uL   RBC Count: 4.70 M/uL   Hemoglobin: 13.5 g/dL   Hematocrit: 41.2 %   Mean Cell Volume: 87.7 fL   Mean Cell Hemoglobin: 28.7 pg   Mean Cell Hemoglobin Conc: 32.8 g/dL   Red Cell Distrib Width: 13.9 %   Platelet Count - Automated: 158: Results Verified K/uL   Auto Neutrophil #: 2.66 K/uL   Auto Lymphocyte #: 2.27 K/uL   Auto Monocyte #: 0.46 K/uL   Auto Eosinophil #: 0.46 K/uL   Auto Basophil #: 0.07 K/uL   Auto Neutrophil %: 44.7  Auto Lymphocyte %: 38.3 %   Auto Monocyte %: 7.8 %   Auto Eosinophil %: 7.8 %   Auto Basophil %: 1.2 %   Auto Immature Granulocyte %: 0.2 %   Nucleated RBC: 0 /100 WBCs   Basic Metabolic Panel (04.11.21 @ 22:47)   Sodium, Serum: 139 mmol/L   Potassium, Serum: 4.8: Hemolyzed. Interpret with caution mmol/L   Chloride, Serum: 103 mmol/L   Carbon Dioxide, Serum: 27 mmol/L   Anion Gap, Serum: 9 mmol/L   Blood Urea Nitrogen, Serum: 17 mg/dL   Creatinine, Serum: 1.2 mg/dL   Glucose, Serum: 107 mg/dL   Calcium, Total Serum: 9.5 mg/dL   eGFR if Non : 63  Lipid Profile (04.13.21 @ 04:30)   Cholesterol, Serum: 232 mg/dL   Triglycerides, Serum: 159 mg/dL   HDL Cholesterol, Serum: 37 mg/dL   Non HDL Cholesterol: 195  LDL Cholesterol Calculated: 176 mg/dL
Complete Blood Count + Automated Diff (04.11.21 @ 22:47)   WBC Count: 5.93 K/uL   RBC Count: 4.70 M/uL   Hemoglobin: 13.5 g/dL   Hematocrit: 41.2 %   Mean Cell Volume: 87.7 fL   Mean Cell Hemoglobin: 28.7 pg   Mean Cell Hemoglobin Conc: 32.8 g/dL   Red Cell Distrib Width: 13.9 %   Platelet Count - Automated: 158: Results Verified K/uL   Auto Neutrophil #: 2.66 K/uL   Auto Lymphocyte #: 2.27 K/uL   Auto Monocyte #: 0.46 K/uL   Auto Eosinophil #: 0.46 K/uL   Auto Basophil #: 0.07 K/uL   Auto Neutrophil %: 44.7  Auto Lymphocyte %: 38.3 %   Auto Monocyte %: 7.8 %   Auto Eosinophil %: 7.8 %   Auto Basophil %: 1.2 %   Auto Immature Granulocyte %: 0.2 %   Nucleated RBC: 0 /100 WBCs   Basic Metabolic Panel (04.11.21 @ 22:47)   Sodium, Serum: 139 mmol/L   Potassium, Serum: 4.8: Hemolyzed. Interpret with caution mmol/L   Chloride, Serum: 103 mmol/L   Carbon Dioxide, Serum: 27 mmol/L   Anion Gap, Serum: 9 mmol/L   Blood Urea Nitrogen, Serum: 17 mg/dL   Creatinine, Serum: 1.2 mg/dL   Glucose, Serum: 107 mg/dL   Calcium, Total Serum: 9.5 mg/dL   eGFR if Non : 63  Lipid Profile (04.13.21 @ 04:30)   Cholesterol, Serum: 232 mg/dL   Triglycerides, Serum: 159 mg/dL   HDL Cholesterol, Serum: 37 mg/dL   Non HDL Cholesterol: 195  LDL Cholesterol Calculated: 176 mg/dL

## 2021-04-22 NOTE — PROGRESS NOTE BEHAVIORAL HEALTH - NSBHCHARTREVIEWVS_PSY_A_CORE FT
Vital Signs Last 24 Hrs  T(C): 36.2 (21 Apr 2021 08:53), Max: 36.3 (21 Apr 2021 05:53)  T(F): 97.2 (21 Apr 2021 08:53), Max: 97.4 (21 Apr 2021 05:53)  HR: 88 (21 Apr 2021 08:53) (68 - 93)  BP: 119/61 (21 Apr 2021 08:53) (118/62 - 132/69)  BP(mean): --  RR: 18 (21 Apr 2021 08:53) (18 - 18)  SpO2: --
Vital Signs Last 24 Hrs  T(C): 36.6 (20 Apr 2021 06:05), Max: 36.6 (20 Apr 2021 06:05)  T(F): 97.8 (20 Apr 2021 06:05), Max: 97.8 (20 Apr 2021 06:05)  HR: 79 (20 Apr 2021 06:05) (78 - 79)  BP: 116/62 (20 Apr 2021 06:05) (116/62 - 116/70)  BP(mean): --  RR: 18 (20 Apr 2021 06:05) (18 - 18)  SpO2: --
Vital Signs Last 24 Hrs  T(C): 36.1 (13 Apr 2021 08:21), Max: 36.4 (12 Apr 2021 15:35)  T(F): 96.9 (13 Apr 2021 08:21), Max: 97.6 (12 Apr 2021 15:35)  HR: 63 (13 Apr 2021 08:21) (63 - 74)  BP: 117/57 (13 Apr 2021 08:21) (102/53 - 140/72)  BP(mean): --  RR: 18 (13 Apr 2021 08:21) (18 - 18)  SpO2: 96% (12 Apr 2021 15:35) (96% - 96%)
Vital Signs Last 24 Hrs  T(C): 36.1 (14 Apr 2021 10:56), Max: 36.3 (14 Apr 2021 06:18)  T(F): 97 (14 Apr 2021 10:56), Max: 97.4 (14 Apr 2021 06:18)  HR: 81 (14 Apr 2021 10:56) (67 - 81)  BP: 112/72 (14 Apr 2021 10:56) (112/72 - 122/56)  BP(mean): --  RR: 17 (14 Apr 2021 10:56) (16 - 18)  SpO2: --
Vital Signs Last 24 Hrs  T(C): 36.3 (22 Apr 2021 10:10), Max: 36.3 (21 Apr 2021 15:42)  T(F): 97.4 (22 Apr 2021 10:10), Max: 97.4 (21 Apr 2021 15:42)  HR: 70 (22 Apr 2021 10:10) (66 - 73)  BP: 94/55 (22 Apr 2021 10:10) (94/55 - 131/69)  BP(mean): --  RR: 16 (22 Apr 2021 10:10) (16 - 20)  SpO2: --
Vital Signs Last 24 Hrs  T(C): 35.8 (19 Apr 2021 08:23), Max: 36.4 (19 Apr 2021 06:00)  T(F): 96.4 (19 Apr 2021 08:23), Max: 97.5 (19 Apr 2021 06:00)  HR: 77 (19 Apr 2021 08:23) (66 - 77)  BP: 128/69 (19 Apr 2021 08:23) (116/58 - 128/69)  BP(mean): --  RR: 18 (19 Apr 2021 08:23) (18 - 20)  SpO2: --
Vital Signs Last 24 Hrs  T(C): 36.4 (16 Apr 2021 09:37), Max: 36.4 (16 Apr 2021 09:35)  T(F): 97.5 (16 Apr 2021 09:37), Max: 97.5 (16 Apr 2021 09:35)  HR: 76 (16 Apr 2021 09:37) (68 - 76)  BP: 115/70 (16 Apr 2021 09:37) (114/55 - 127/59)  BP(mean): --  RR: 18 (16 Apr 2021 09:37) (15 - 18)  SpO2: --
Vital Signs Last 24 Hrs  T(C): 36.7 (15 Apr 2021 08:18), Max: 36.7 (15 Apr 2021 08:18)  T(F): 98 (15 Apr 2021 08:18), Max: 98 (15 Apr 2021 08:18)  HR: 74 (15 Apr 2021 08:18) (70 - 74)  BP: 148/74 (15 Apr 2021 08:18) (113/57 - 148/74)  BP(mean): --  RR: 16 (15 Apr 2021 08:18) (16 - 18)  SpO2: --

## 2021-04-22 NOTE — DISCHARGE NOTE BEHAVIORAL HEALTH - NSBHDCSUICFCTROTHERFT_PSY_A_CORE
Patient and provider identified future stressors as being noncompliance with medication/outpatient follow up, employment instability, financial instability, residential instability

## 2021-04-22 NOTE — PROGRESS NOTE BEHAVIORAL HEALTH - NSBHCHARTREVIEWINVESTIGATE_PSY_A_CORE FT
< from: 12 Lead ECG (04.11.21 @ 23:24) >      Ventricular Rate 54 BPM    Atrial Rate 54 BPM    P-R Interval 180 ms    QRS Duration 90 ms    Q-T Interval 394 ms    QTC Calculation(Bazett) 373 ms    P Axis 35 degrees    R Axis 40 degrees    T Axis 15 degrees    Diagnosis Line Sinus bradycardia  Otherwise normal ECG

## 2021-04-22 NOTE — PROGRESS NOTE BEHAVIORAL HEALTH - NSBHFUPINTERVALHXFT_PSY_A_CORE
(, Tan, ID #[id number])    Pt seen and evaluated during treatment team, chart reviewed. As per nursing report, no acute events overnight. On evaluation, pt reports good mood, sleep and appetite. He endorses improved anxiety, states "I'm free", endorses feeling safe with decreased paranoia, denies thoughts of being monitored and followed. He denies AVH, delusions. Denies SI/HI, intent and plan. Pt appears future-oriented, endorses looking forward to going home and continuing psychotherapy OP. Pt adherent to medications, denies negative side effects. Pt in good behavioral control. Spoke with pt's ex-wife, Leilani - reports spoke with pt yesterday, states pt appears at baseline, denies concerns for discharge tomorrow.   Pt denies s/sx of Covid-19.

## 2021-04-22 NOTE — DISCHARGE NOTE BEHAVIORAL HEALTH - NSBHDCSUICFCTRMIT_PSY_A_CORE
Patient can rely on his family for social support. Patient has been medication compliant, not endorsing any side effects. Patient is future-oriented, looking forward to returning home and OP treatment. Patient verbalizing reasons for living. Patient with improved insight into events that led up to hospitalization. Patient to use positive coping skills learned during the course of the inpatient hospitalization, ie STOP Skill. Patient verbalized willingness to seek care in moment of crisis. Patient is agreeable that should he have any thoughts of hurting himself or others, he will call 911, return to the ED or call the National Suicide Prevention Lifeline, immediately.

## 2021-04-22 NOTE — DISCHARGE NOTE BEHAVIORAL HEALTH - NSBHDCRESPONSEFT_PSY_A_CORE
Pt has made significant progress over the course of hospitalization. With continuous psychotherapy from the treatment team and the medications, patient reports feeling better, sleeping and eating well. Thought process and insight improved. Pt was calm and cooperative and was in good behavioral control. Patient denied any suicidal or homicidal ideations. Patient denied any auditory or visual hallucinations. Pt was evaluated by treatment team, pt is stable for discharge and patient shows no imminent danger to self, others or property at this time. Pt understands and agrees with treatment plan and following up with outpatient. Psychoeducation provided regarding diagnosis, medications, treatment and follow up. Risks, benefits, alternatives discussed, all questions and concerns addressed and answered.

## 2021-04-22 NOTE — DISCHARGE NOTE BEHAVIORAL HEALTH - NSFOLLOWUPCOMMENTS_ALL_CORE_SIUH
Follow-up with Dr. Spencer, make an appointment and call: [phone] Follow-up with Dr. Spencer: Monday, 04/26/2021 at 9:00 AM  [address], 1st [address], [address], [place] [address]  [phone]

## 2021-04-22 NOTE — PROGRESS NOTE BEHAVIORAL HEALTH - NSBHCHARTREVIEWIMAGING_PSY_A_CORE FT
< from: CT Head No Cont (04.12.21 @ 03:35) >    IMPRESSION:      No evidence of acute intracranial pathology.    Mild chronic microvascular ischemic changes.    < end of copied text >

## 2021-04-22 NOTE — PROGRESS NOTE BEHAVIORAL HEALTH - NSBHPTASSESSDT_PSY_A_CORE
17-Apr-2021 14:12
15-Apr-2021 10:00
14-Apr-2021 10:00
13-Apr-2021 10:45
20-Apr-2021 09:00
22-Apr-2021 09:30
21-Apr-2021 09:30
16-Apr-2021 09:00
19-Apr-2021 09:00

## 2021-04-22 NOTE — PROGRESS NOTE BEHAVIORAL HEALTH - NSBHATTESTSEENBY_PSY_A_CORE
attending Psychiatrist without NP/Trainee
NP without Attending Psychiatrist

## 2021-04-22 NOTE — DISCHARGE NOTE BEHAVIORAL HEALTH - FAMILY HISTORY OF PSYCHIATRIC ILLNESS
As noted above.  Domiciled alone in [place], [place]. , pt's ex-wife and son live in , are in good relations. Employed as a .

## 2021-04-23 VITALS
HEART RATE: 101 BPM | TEMPERATURE: 97 F | DIASTOLIC BLOOD PRESSURE: 87 MMHG | SYSTOLIC BLOOD PRESSURE: 151 MMHG | RESPIRATION RATE: 16 BRPM

## 2021-04-23 PROCEDURE — 99238 HOSP IP/OBS DSCHRG MGMT 30/<: CPT

## 2021-04-23 RX ORDER — RISPERIDONE 4 MG/1
1 TABLET ORAL
Qty: 14 | Refills: 0
Start: 2021-04-23 | End: 2021-05-06

## 2021-04-23 RX ORDER — SERTRALINE 25 MG/1
1 TABLET, FILM COATED ORAL
Qty: 14 | Refills: 0
Start: 2021-04-23 | End: 2021-05-06

## 2021-04-23 RX ADMIN — SERTRALINE 50 MILLIGRAM(S): 25 TABLET, FILM COATED ORAL at 08:50

## 2021-04-23 RX ADMIN — RISPERIDONE 1 MILLIGRAM(S): 4 TABLET ORAL at 08:50

## 2021-04-23 NOTE — CHART NOTE - NSCHARTNOTEFT_GEN_A_CORE
Patient seen at bedside. Per 1:1 at bedside, no agitation noted overnight, reportedly slept intermittently during the night and had a bit of breakfast.  Patient continues to report ongoing depressive thoughts.  9.39 legals completed and placed in chart.
Social Work Admit Note:    Patient is a 65 years of age male who was admitted for evaluation of paranoia and suicidal ideations.  At the time of the assessment in the emergency department, patient informed that he has been feeling very "fearful and having lots of concerns".  Patient said "I have the urge to commit suicide" –because he has been feeling like people are following him.  Patient informed that people have been taking pictures of him, taking pictures of his license plate, and hacking his phone.  He endorsed feelings of helplessness and hopelessness and reported that he has been thinking of jumping off the bridge.      In the community, patient is domiciled alone in an apartment in [place].  He is  and has an adult son.  Patient is employed as a .  He has no past psychiatric history and no past inpatient hospitalizations.  Patient denies use of alcohol and other substances.      Sexual History – Patient identifies as heterosexual   Family relationships and history – Patient is .  He has support from his son and other family members,    Leisure Activity Assessment – Spending time with family   Community Supports – No known attendance in any self- help groups or other organizations.  Employment – Patient is employed as a    Substance Use Assessment – Patient denies   History of suicidality or self- injurious behaviors – None known   Significant Loses – None known  Life Goals – Patient verbalized goal of improved mental health     will continue to meet with patient 1:1 and with treatment team daily.  Discharge plan is for continued mental health treatment in outpatient setting.       Please refer to Social Work Psychosocial for additional information.
Social Work Discharge Note:    Patient is for discharge today.   He is alert and oriented x3.  Mood has improved.  Anxiety has decreased.  Insight and judgment have improved.  Suicidal/homicidal ideation denied.     Patient will be discharged to his apartment in [place].  He has been referred to [facility] for continued mental health treatment in the community.      [facility] [phone] provided as an additional resource.     Patient is aware and agreeable to discharge today.
Social Work Note:    Scott remains on the unit for continued treatment, safety, and observation.  Patient attended treatment team meeting today to discuss medications, treatment, and discharge plans.  Patient was calm, cooperative, and pleasant.  He reports he does not feel depressed or suicidal on the unit because he feels safe.  He endorses that he is less worried about the people he believed were following him prior to admission.  Patient still presents as paranoid/delusional at times.  He continues to endorse the belief that his phone has been being “hacked”.  Patient has been medication compliant and in good behavioral control.  He denies SI/HI and A/V/H.      SW met with patient to discuss discharge plans.  Patient reports he will return to his apartment in [place].  Patient will need a referral for outpatient mental health treatment in the community.  ([facility]?)    Patient is not yet psychiatrically stable for discharge.      Mental Status Exam:    Mood – Euthymic/Anxious   Sleep - Good  Appetite - Good  ADLs - Good  Thought Process – Linear  Observation – Routine q10.
Social Work Note:    Scott remains on the unit for continued treatment, safety, and observation.  Treatment team met with patient during morning rounds.  Patient was calm, cooperative, and pleasant.  Patient endorses good mood, sleep, and appetite.  He appears less paranoid than on previous evaluations.  He denies concerns of people coming in and out of his room.  He denies depression or suicidal ideation.  Patient has been medication compliant and in good behavioral control.      SW met with patient to discuss discharge plans.  Patient reports he will return to his apartment in [place].  Plan is for continued mental health treatment in an outpatient setting.  Referral sent to [facility] on 4/20.    Patient is not yet psychiatrically stable for discharge.      Mental Status Exam:    Mood – Euthymic  Sleep - Good  Appetite - Good  ADLs - Good  Thought Process – Linear  Observation – Routine q10.
Social Work Note:    Scott remains on the unit for continued treatment, safety, and observation.  Treatment team met with patient during morning rounds.  Patient was calm, cooperative, and pleasant.  Patient endorses good mood, sleep, and appetite.  He denies depression or suicidal ideation.  Patient noted to paranoid and suspicious.  He repeatedly discussed how people keep coming in and out of his room (staff).  He asked several times if a camera can be installed by his door so he can monitor who is coming in and out.  Treatment team provided education on unit protocol, 10 min checks, etc.  Patient continued to be perseverative on the subject and endorsed his concern.  Patient has been mostly isolative to his room.  He has been medication compliant and in good behavioral control.      SW met with patient to discuss discharge plans.  Patient reports he will return to his apartment in [place].  Patient will need a referral for outpatient mental health treatment in the community.  ([facility]?)    Patient is not yet psychiatrically stable for discharge.      Mental Status Exam:    Mood – Euthymic  Sleep - Good  Appetite - Good  ADLs - Good  Thought Process – Linear/Perseverative  Observation – Routine q10.
Pt was seen, evaluated with Ukrainian interpretor, chart reviewed.  As per nursing staff, no acute events overnight, no PRNs. On evaluation, pt reports that he is doing well and ready for discharge. Has been in good behavioral control. He is compliant with medications, denies negative side effects. Endorsed improved sleep and appetite. Denied auditory/visual hallucinations. Denied paranoia. Continues with delusion that he was hacked/followed prior to admission, denied continuation of delusion, reports feels safe and agrees to safety planning. Denied suicidal/homicidal ideation, intent or plan. Pt is for discharge today. Agreeable with outpatient follow up and Covid-19 discharge recommendations. Pt and pt’s ex-wife Leilani verbalizes understanding and agrees to discharge instructions.
pt was seen in bed, alert, comfortable, NAD  covid swab obtained from b/l nostrils.   pt tolerated procedure well  fup results  monitor vss  monitor pt

## 2021-04-26 ENCOUNTER — APPOINTMENT (OUTPATIENT)
Dept: INFECTIOUS DISEASE | Facility: CLINIC | Age: 66
End: 2021-04-26
Payer: COMMERCIAL

## 2021-04-26 ENCOUNTER — OUTPATIENT (OUTPATIENT)
Dept: OUTPATIENT SERVICES | Facility: HOSPITAL | Age: 66
LOS: 1 days | Discharge: HOME | End: 2021-04-26

## 2021-04-26 VITALS
HEIGHT: 71 IN | TEMPERATURE: 98.5 F | WEIGHT: 240 LBS | SYSTOLIC BLOOD PRESSURE: 121 MMHG | DIASTOLIC BLOOD PRESSURE: 68 MMHG | RESPIRATION RATE: 16 BRPM | BODY MASS INDEX: 33.6 KG/M2 | HEART RATE: 80 BPM

## 2021-04-26 DIAGNOSIS — F32.9 MAJOR DEPRESSIVE DISORDER, SINGLE EPISODE, UNSPECIFIED: ICD-10-CM

## 2021-04-26 DIAGNOSIS — Z87.828 PERSONAL HISTORY OF OTHER (HEALED) PHYSICAL INJURY AND TRAUMA: Chronic | ICD-10-CM

## 2021-04-26 PROCEDURE — 99204 OFFICE O/P NEW MOD 45 MIN: CPT

## 2021-04-26 RX ORDER — RISPERIDONE 2 MG/1
2 TABLET, FILM COATED ORAL
Qty: 30 | Refills: 0 | Status: ACTIVE | COMMUNITY
Start: 2021-04-26

## 2021-04-26 RX ORDER — PENICILLIN G BENZATHINE 2400000 [IU]/4ML
2400000 INJECTION, SUSPENSION INTRAMUSCULAR
Qty: 3 | Refills: 0 | Status: ACTIVE | COMMUNITY
Start: 2021-04-26 | End: 1900-01-01

## 2021-04-26 RX ORDER — SERTRALINE HYDROCHLORIDE 50 MG/1
50 TABLET, FILM COATED ORAL DAILY
Qty: 30 | Refills: 0 | Status: ACTIVE | COMMUNITY
Start: 2021-04-26

## 2021-04-26 RX ORDER — RISPERIDONE 1 MG/1
1 TABLET, FILM COATED ORAL DAILY
Qty: 30 | Refills: 0 | Status: ACTIVE | COMMUNITY
Start: 2021-04-26

## 2021-04-26 NOTE — PHYSICAL EXAM
[General Appearance - Alert] : alert [General Appearance - In No Acute Distress] : in no acute distress [Sclera] : the sclera and conjunctiva were normal [PERRL With Normal Accommodation] : pupils were equal in size, round, reactive to light [Extraocular Movements] : extraocular movements were intact [Outer Ear] : the ears and nose were normal in appearance [Oropharynx] : the oropharynx was normal with no thrush [Neck Appearance] : the appearance of the neck was normal [Neck Cervical Mass (___cm)] : no neck mass was observed [Jugular Venous Distention Increased] : there was no jugular-venous distention [Thyroid Diffuse Enlargement] : the thyroid was not enlarged [Auscultation Breath Sounds / Voice Sounds] : lungs were clear to auscultation bilaterally [Heart Rate And Rhythm] : heart rate was normal and rhythm regular [Heart Sounds] : normal S1 and S2 [Heart Sounds Gallop] : no gallops [Murmurs] : no murmurs [Heart Sounds Pericardial Friction Rub] : no pericardial rub [Full Pulse] : the pedal pulses are present [Edema] : there was no peripheral edema [Abdomen Soft] : soft [Bowel Sounds] : normal bowel sounds [Abdomen Tenderness] : non-tender [Abdomen Mass (___ Cm)] : no abdominal mass palpated [Costovertebral Angle Tenderness] : no CVA tenderness [No Palpable Adenopathy] : no palpable adenopathy [Musculoskeletal - Swelling] : no joint swelling [Nail Clubbing] : no clubbing  or cyanosis of the fingernails [Motor Tone] : muscle strength and tone were normal [Skin Color & Pigmentation] : normal skin color and pigmentation [] : no rash [No Focal Deficits] : no focal deficits [Oriented To Time, Place, And Person] : oriented to person, place, and time [Affect] : the affect was normal [FreeTextEntry1] : Obese

## 2021-04-26 NOTE — HISTORY OF PRESENT ILLNESS
[Women] : with women [Sexually Active] : The patient is not sexually active [de-identified] : Abstinent x 2-3 years. He denied prior history of syphilis, gonorrhea, chlamydia or other STIs.

## 2021-04-26 NOTE — REASON FOR VISIT
[Consultation] : a consultation visit [FreeTextEntry1] : 65 yr old male recently hospitalized from [facility] for depression with paranoia and suicidal ideation and was found to have Reactive RPR 1:1 so pt was referred here for further evaluation and treatment.

## 2021-04-27 DIAGNOSIS — R79.89 OTHER SPECIFIED ABNORMAL FINDINGS OF BLOOD CHEMISTRY: ICD-10-CM

## 2021-04-27 DIAGNOSIS — R45.851 SUICIDAL IDEATIONS: ICD-10-CM

## 2021-04-27 DIAGNOSIS — E66.9 OBESITY, UNSPECIFIED: ICD-10-CM

## 2021-04-27 DIAGNOSIS — F29 UNSPECIFIED PSYCHOSIS NOT DUE TO A SUBSTANCE OR KNOWN PHYSIOLOGICAL CONDITION: ICD-10-CM

## 2021-04-27 DIAGNOSIS — F22 DELUSIONAL DISORDERS: ICD-10-CM

## 2021-04-27 DIAGNOSIS — A52.9 LATE SYPHILIS, UNSPECIFIED: ICD-10-CM

## 2021-04-27 DIAGNOSIS — F32.9 MAJOR DEPRESSIVE DISORDER, SINGLE EPISODE, UNSPECIFIED: ICD-10-CM

## 2021-05-03 ENCOUNTER — OUTPATIENT (OUTPATIENT)
Dept: OUTPATIENT SERVICES | Facility: HOSPITAL | Age: 66
LOS: 1 days | Discharge: HOME | End: 2021-05-03

## 2021-05-03 ENCOUNTER — APPOINTMENT (OUTPATIENT)
Dept: INFECTIOUS DISEASE | Facility: CLINIC | Age: 66
End: 2021-05-03
Payer: COMMERCIAL

## 2021-05-03 VITALS
TEMPERATURE: 98.2 F | BODY MASS INDEX: 33.6 KG/M2 | OXYGEN SATURATION: 98 % | DIASTOLIC BLOOD PRESSURE: 70 MMHG | HEART RATE: 88 BPM | RESPIRATION RATE: 18 BRPM | HEIGHT: 71 IN | SYSTOLIC BLOOD PRESSURE: 120 MMHG | WEIGHT: 240 LBS

## 2021-05-03 DIAGNOSIS — Z87.828 PERSONAL HISTORY OF OTHER (HEALED) PHYSICAL INJURY AND TRAUMA: Chronic | ICD-10-CM

## 2021-05-03 DIAGNOSIS — A53.0 LATENT SYPHILIS, UNSPECIFIED AS EARLY OR LATE: ICD-10-CM

## 2021-05-03 PROCEDURE — 99213 OFFICE O/P EST LOW 20 MIN: CPT

## 2021-05-03 NOTE — PHYSICAL EXAM
[General Appearance - Alert] : alert [General Appearance - In No Acute Distress] : in no acute distress [Sclera] : the sclera and conjunctiva were normal [PERRL With Normal Accommodation] : pupils were equal in size, round, reactive to light [Extraocular Movements] : extraocular movements were intact [Outer Ear] : the ears and nose were normal in appearance [Oropharynx] : the oropharynx was normal with no thrush [Neck Appearance] : the appearance of the neck was normal [Neck Cervical Mass (___cm)] : no neck mass was observed [Jugular Venous Distention Increased] : there was no jugular-venous distention [Thyroid Diffuse Enlargement] : the thyroid was not enlarged [Respiration, Rhythm And Depth] : normal respiratory rhythm and effort [Exaggerated Use Of Accessory Muscles For Inspiration] : no accessory muscle use [Heart Rate And Rhythm] : heart rate was normal and rhythm regular [Murmurs] : no murmurs [Full Pulse] : the pedal pulses are present [Edema] : there was no peripheral edema [Bowel Sounds] : normal bowel sounds [Abdomen Soft] : soft [Abdomen Tenderness] : non-tender [Abdomen Mass (___ Cm)] : no abdominal mass palpated [Costovertebral Angle Tenderness] : no CVA tenderness [No Palpable Adenopathy] : no palpable adenopathy [Musculoskeletal - Swelling] : no joint swelling [Nail Clubbing] : no clubbing  or cyanosis of the fingernails [Motor Tone] : muscle strength and tone were normal [Skin Color & Pigmentation] : normal skin color and pigmentation [] : no rash [Deep Tendon Reflexes (DTR)] : deep tendon reflexes were 2+ and symmetric [Sensation] : the sensory exam was normal to light touch and pinprick [No Focal Deficits] : no focal deficits [Oriented To Time, Place, And Person] : oriented to person, place, and time [Affect] : the affect was normal

## 2021-05-03 NOTE — HISTORY OF PRESENT ILLNESS
[FreeTextEntry1] : Pt here for Bicillin injection for latent syphilis #2.He offered no present complaints. He tolerated 1st injection well without severe SE.

## 2021-05-10 ENCOUNTER — APPOINTMENT (OUTPATIENT)
Dept: INFECTIOUS DISEASE | Facility: CLINIC | Age: 66
End: 2021-05-10
Payer: COMMERCIAL

## 2021-05-10 ENCOUNTER — OUTPATIENT (OUTPATIENT)
Dept: OUTPATIENT SERVICES | Facility: HOSPITAL | Age: 66
LOS: 1 days | Discharge: HOME | End: 2021-05-10

## 2021-05-10 VITALS
HEIGHT: 60 IN | DIASTOLIC BLOOD PRESSURE: 76 MMHG | HEART RATE: 80 BPM | BODY MASS INDEX: 46.78 KG/M2 | RESPIRATION RATE: 16 BRPM | WEIGHT: 238.29 LBS | SYSTOLIC BLOOD PRESSURE: 130 MMHG | TEMPERATURE: 97.6 F

## 2021-05-10 DIAGNOSIS — A53.0 LATENT SYPHILIS, UNSPECIFIED AS EARLY OR LATE: ICD-10-CM

## 2021-05-10 DIAGNOSIS — Z87.828 PERSONAL HISTORY OF OTHER (HEALED) PHYSICAL INJURY AND TRAUMA: Chronic | ICD-10-CM

## 2021-05-10 PROCEDURE — 99213 OFFICE O/P EST LOW 20 MIN: CPT

## 2021-05-10 RX ORDER — PENICILLIN G BENZATHINE 2400000 [IU]/4ML
2400000 INJECTION, SUSPENSION INTRAMUSCULAR
Qty: 3 | Refills: 0 | Status: ACTIVE | COMMUNITY
Start: 2021-05-03

## 2021-05-10 NOTE — HISTORY OF PRESENT ILLNESS
[FreeTextEntry1] : 65 yr old male here for 3rd Bicillin injection for Latent Syphilis.No reactions from previous Bicillin injections. Pt offered no present complaints. [Sexually Active] : The patient is not sexually active

## 2021-05-10 NOTE — ASSESSMENT
[FreeTextEntry1] : 65 yr old male with H/O Depression (stable on Meds) here for 3rd Bicillin shot for new positve serolgy for syphilis. Pt has been asymptomatic but Rx was indicated since pt had no prior H/O syphilis with Rx. \par Pt told to F/U with PCP and Psychiatrist.\par No further Rx or F/U needed here. [Nutritional / Food Issues] : nutritional/food issues [HIV Education] : HIV Education [Sexuality / Safer Sex] : sexuality/safer sex

## 2021-05-10 NOTE — PHYSICAL EXAM
[General Appearance - Alert] : alert [General Appearance - In No Acute Distress] : in no acute distress [FreeTextEntry1] : Obese [Sclera] : the sclera and conjunctiva were normal [PERRL With Normal Accommodation] : pupils were equal in size, round, reactive to light [Extraocular Movements] : extraocular movements were intact [Outer Ear] : the ears and nose were normal in appearance [Oropharynx] : the oropharynx was normal with no thrush [Neck Appearance] : the appearance of the neck was normal [Neck Cervical Mass (___cm)] : no neck mass was observed [Jugular Venous Distention Increased] : there was no jugular-venous distention [Thyroid Diffuse Enlargement] : the thyroid was not enlarged [Auscultation Breath Sounds / Voice Sounds] : lungs were clear to auscultation bilaterally [Heart Rate And Rhythm] : heart rate was normal and rhythm regular [Heart Sounds] : normal S1 and S2 [Heart Sounds Gallop] : no gallops [Murmurs] : no murmurs [Heart Sounds Pericardial Friction Rub] : no pericardial rub [Full Pulse] : the pedal pulses are present [Edema] : there was no peripheral edema [Bowel Sounds] : normal bowel sounds [Abdomen Soft] : soft [Abdomen Tenderness] : non-tender [Abdomen Mass (___ Cm)] : no abdominal mass palpated [Costovertebral Angle Tenderness] : no CVA tenderness [No Palpable Adenopathy] : no palpable adenopathy [Musculoskeletal - Swelling] : no joint swelling [Nail Clubbing] : no clubbing  or cyanosis of the fingernails [Motor Tone] : muscle strength and tone were normal [Skin Color & Pigmentation] : normal skin color and pigmentation [] : no rash [Deep Tendon Reflexes (DTR)] : deep tendon reflexes were 2+ and symmetric [Sensation] : the sensory exam was normal to light touch and pinprick [No Focal Deficits] : no focal deficits [Oriented To Time, Place, And Person] : oriented to person, place, and time [Affect] : the affect was normal

## 2021-05-13 ENCOUNTER — OUTPATIENT (OUTPATIENT)
Dept: OUTPATIENT SERVICES | Facility: HOSPITAL | Age: 66
LOS: 1 days | Discharge: HOME | End: 2021-05-13

## 2021-05-13 ENCOUNTER — APPOINTMENT (OUTPATIENT)
Dept: INTERNAL MEDICINE | Facility: CLINIC | Age: 66
End: 2021-05-13
Payer: COMMERCIAL

## 2021-05-13 ENCOUNTER — NON-APPOINTMENT (OUTPATIENT)
Age: 66
End: 2021-05-13

## 2021-05-13 DIAGNOSIS — Z00.00 ENCOUNTER FOR GENERAL ADULT MEDICAL EXAMINATION W/OUT ABNORMAL FINDINGS: ICD-10-CM

## 2021-05-13 DIAGNOSIS — Z87.828 PERSONAL HISTORY OF OTHER (HEALED) PHYSICAL INJURY AND TRAUMA: Chronic | ICD-10-CM

## 2021-05-13 DIAGNOSIS — Z00.00 ENCOUNTER FOR GENERAL ADULT MEDICAL EXAMINATION WITHOUT ABNORMAL FINDINGS: ICD-10-CM

## 2021-05-13 PROCEDURE — 99213 OFFICE O/P EST LOW 20 MIN: CPT | Mod: GC

## 2021-05-13 RX ORDER — ATORVASTATIN CALCIUM 40 MG/1
40 TABLET, FILM COATED ORAL
Qty: 30 | Refills: 3 | Status: ACTIVE | COMMUNITY
Start: 2021-05-13 | End: 1900-01-01

## 2021-05-13 NOTE — HISTORY OF PRESENT ILLNESS
[de-identified] : 66yo male with PMH of Latent Syphilis, Depression, Psychosis here for follow up. \par \par Pt was also recently discharged from Inpatient Psychiatry on 4/12-4/22 for psychosis. He presented with symptoms of suicidal ideation and paranoia. He was discharged on a Risperidone (2mg in AM, 1 mg in PM) and Sertraline 50mg x 14 days. He denies any current psychotic symptoms and has an appointment to follow up with a Psychiatrist in [place] on 5/19.\par \par During his admission at [facility], pt was found to have a reactive RPR 1:1, so he was referred to Infectious Disease clinic. He is now s/p his 3rd Bicillin injection for Latent Syphilis on 5/10/21 at the Infectious Disease clinic. He has not had any reactions from any of the Bicillin injections. Pt has no present complaints. \par \par Labs performed while inpatient were notable for:\par Total cholesterol 232, Triglycerides 159, \par A1c 6.0\par \par Patient has no complaints today\par \par

## 2021-05-13 NOTE — ASSESSMENT
[FreeTextEntry1] : 64yo male with PMH of Latent Syphilis, Depression, Psychosis here for follow up. \par \par \par Psychosis\par - Recently discharged from [facility] 4/12-4/22\par - Discharged with Risperidone and Sertraline 14 day course, completed\par - No further symptoms of psychosis\par \par Positive RPR serology\par - Seen by Infectious Disease\par - S/p 3 shots of Bicillin\par \par Hyperlipidemia\par - Total cholesterol 232, Triglycerides 159, \par - Will start Atorvastatin 40mg daily\par \par HCM\par - Referral given for Colonoscopy\par - Repeat lab work ordered\par - Rtc in 3 months and prn

## 2021-05-14 ENCOUNTER — NON-APPOINTMENT (OUTPATIENT)
Age: 66
End: 2021-05-14

## 2021-09-21 ENCOUNTER — RESULT REVIEW (OUTPATIENT)
Age: 66
End: 2021-09-21